# Patient Record
Sex: FEMALE | Race: WHITE | Employment: OTHER | URBAN - METROPOLITAN AREA
[De-identification: names, ages, dates, MRNs, and addresses within clinical notes are randomized per-mention and may not be internally consistent; named-entity substitution may affect disease eponyms.]

---

## 2017-08-15 ENCOUNTER — ANESTHESIA EVENT (OUTPATIENT)
Dept: SURGERY | Age: 67
End: 2017-08-15
Payer: COMMERCIAL

## 2017-08-15 ENCOUNTER — HOSPITAL ENCOUNTER (OUTPATIENT)
Dept: PREADMISSION TESTING | Age: 67
Discharge: HOME | End: 2017-08-15
Payer: COMMERCIAL

## 2017-08-15 VITALS
DIASTOLIC BLOOD PRESSURE: 86 MMHG | TEMPERATURE: 98.6 F | SYSTOLIC BLOOD PRESSURE: 125 MMHG | OXYGEN SATURATION: 100 % | RESPIRATION RATE: 63 BRPM | HEART RATE: 64 BPM | BODY MASS INDEX: 23.62 KG/M2 | HEIGHT: 68 IN | WEIGHT: 155.87 LBS

## 2017-08-15 LAB
ANION GAP BLD CALC-SCNC: 6 MMOL/L (ref 5–15)
ATRIAL RATE: 60 BPM
BASOPHILS # BLD AUTO: 0 K/UL (ref 0–0.1)
BASOPHILS # BLD: 0 % (ref 0–1)
BUN SERPL-MCNC: 20 MG/DL (ref 6–20)
BUN/CREAT SERPL: 26 (ref 12–20)
CALCIUM SERPL-MCNC: 10.2 MG/DL (ref 8.5–10.1)
CALCULATED P AXIS, ECG09: 54 DEGREES
CALCULATED R AXIS, ECG10: 23 DEGREES
CALCULATED T AXIS, ECG11: 29 DEGREES
CHLORIDE SERPL-SCNC: 103 MMOL/L (ref 97–108)
CO2 SERPL-SCNC: 32 MMOL/L (ref 21–32)
CREAT SERPL-MCNC: 0.76 MG/DL (ref 0.55–1.02)
DIAGNOSIS, 93000: NORMAL
EOSINOPHIL # BLD: 0.1 K/UL (ref 0–0.4)
EOSINOPHIL NFR BLD: 2 % (ref 0–7)
ERYTHROCYTE [DISTWIDTH] IN BLOOD BY AUTOMATED COUNT: 13.6 % (ref 11.5–14.5)
GLUCOSE SERPL-MCNC: 94 MG/DL (ref 65–100)
HCT VFR BLD AUTO: 44.1 % (ref 35–47)
HGB BLD-MCNC: 14.6 G/DL (ref 11.5–16)
LYMPHOCYTES # BLD AUTO: 32 % (ref 12–49)
LYMPHOCYTES # BLD: 2.6 K/UL (ref 0.8–3.5)
MCH RBC QN AUTO: 30.9 PG (ref 26–34)
MCHC RBC AUTO-ENTMCNC: 33.1 G/DL (ref 30–36.5)
MCV RBC AUTO: 93.2 FL (ref 80–99)
MONOCYTES # BLD: 0.7 K/UL (ref 0–1)
MONOCYTES NFR BLD AUTO: 8 % (ref 5–13)
NEUTS SEG # BLD: 4.8 K/UL (ref 1.8–8)
NEUTS SEG NFR BLD AUTO: 58 % (ref 32–75)
P-R INTERVAL, ECG05: 170 MS
PLATELET # BLD AUTO: 346 K/UL (ref 150–400)
POTASSIUM SERPL-SCNC: 5 MMOL/L (ref 3.5–5.1)
Q-T INTERVAL, ECG07: 420 MS
QRS DURATION, ECG06: 88 MS
QTC CALCULATION (BEZET), ECG08: 420 MS
RBC # BLD AUTO: 4.73 M/UL (ref 3.8–5.2)
SODIUM SERPL-SCNC: 141 MMOL/L (ref 136–145)
VENTRICULAR RATE, ECG03: 60 BPM
WBC # BLD AUTO: 8.3 K/UL (ref 3.6–11)

## 2017-08-15 PROCEDURE — 80048 BASIC METABOLIC PNL TOTAL CA: CPT | Performed by: SURGERY

## 2017-08-15 PROCEDURE — 85025 COMPLETE CBC W/AUTO DIFF WBC: CPT | Performed by: SURGERY

## 2017-08-15 PROCEDURE — 36415 COLL VENOUS BLD VENIPUNCTURE: CPT | Performed by: SURGERY

## 2017-08-15 PROCEDURE — 93005 ELECTROCARDIOGRAM TRACING: CPT

## 2017-08-15 RX ORDER — TRAZODONE HYDROCHLORIDE 50 MG/1
50 TABLET ORAL
COMMUNITY

## 2017-08-15 RX ORDER — BISMUTH SUBSALICYLATE 262 MG
1 TABLET,CHEWABLE ORAL DAILY
COMMUNITY
End: 2020-03-09

## 2017-08-15 NOTE — PERIOP NOTES
Patient aware needs to arrive at 8am.  Patient removed from preop phone call list and advised to arrive 0800 1st floor registration.

## 2017-08-15 NOTE — PERIOP NOTES
[facility]  PREOPERATIVE INSTRUCTIONS    Surgery Date:   08/16/17      Surgery arrival time given by surgeon: YES 0800 for Mammo  (If [facility] staff will call you between 3pm - 7pm the day before surgery with your arrival time. If your surgery is on a Monday, we will call you the preceding Friday. Please call [phone] after 7pm if you did not receive your arrival time.)  1. Report  to the 2nd Floor Admitting Desk on the day of your surgery. Bring your insurance card, photo identification, and any copayment (if applicable). 2. You must have a responsible adult to drive you home and stay with you the first 24 hours after surgery if you are going home the same day of your surgery. 3. Nothing to eat or drink after midnight the night before surgery. This means NO water, gum, mints, coffee, juice, etc.    4. MEDICATIONS TO TAKE THE MORNING OF SURGERY WITH A SIP OF WATER:None  5. No alcoholic beverages 24 hours before and after your surgery. 6. If you are being admitted to the hospital,please leave personal belongings/luggage in your car until you have an assigned hospital room number. ( The hospital discharge time is 12 PM NOON. Your adult  should be at the hospital prior to the noon discharge time unless otherwise instructed.)   7. STOP Aspirin and/or any non-steroidal anti-inflammatory drugs (i.e. Ibuprofen, Naproxen, Advil, Aleve) as directed by your surgeon. You may take Tylenol. Stop herbal supplements 1 week prior to  surgery. 8. If you are currently taking Plavix, Coumadin,or any other blood-thinning/ anticoagulant medication contact your surgeon for instructions. 9. Wear comfortable clothes. Wear your glasses instead of contacts. Please leave all money, jewelry and valuables at home. No make up, particularly mascara, the day of surgery. 10.  REMOVE ALL body piercings, rings,and jewelry and leave at home. Wear your hair loose or down, no pony-tails, buns, or any metal hair clips.    11. If you shower the morning of surgery, please do not apply any lotions, powders, or deodorants afterwards. Do not shave any body area within 24 hours of your surgery. 12. Please follow all instructions to avoid any potential surgical cancellation. 13. Should your physical condition change, (i.e. fever, cold, flu, etc.) please notify your surgeon as soon as possible. 14. It is important to be on time. If a situation occurs where you may be delayed, please call:  [phone] / [phone] on the day of surgery. 15. The Preadmission Testing staff can be reached at [phone]. 16. Special instructions: None  17. The patient was contacted  in person. She  verbalize  understanding of all instructions does not  need reinforcement.

## 2017-08-16 ENCOUNTER — HOSPITAL ENCOUNTER (OUTPATIENT)
Age: 67
Setting detail: OUTPATIENT SURGERY
Discharge: HOME | End: 2017-08-16
Attending: SURGERY | Admitting: SURGERY
Payer: COMMERCIAL

## 2017-08-16 ENCOUNTER — APPOINTMENT (OUTPATIENT)
Dept: MAMMOGRAPHY | Age: 67
End: 2017-08-16
Attending: SURGERY
Payer: COMMERCIAL

## 2017-08-16 ENCOUNTER — HOSPITAL ENCOUNTER (OUTPATIENT)
Dept: MAMMOGRAPHY | Age: 67
Discharge: HOME | End: 2017-08-16
Attending: SURGERY
Payer: COMMERCIAL

## 2017-08-16 ENCOUNTER — ANESTHESIA (OUTPATIENT)
Dept: SURGERY | Age: 67
End: 2017-08-16
Payer: COMMERCIAL

## 2017-08-16 VITALS
OXYGEN SATURATION: 98 % | SYSTOLIC BLOOD PRESSURE: 105 MMHG | BODY MASS INDEX: 23.62 KG/M2 | HEART RATE: 65 BPM | RESPIRATION RATE: 14 BRPM | DIASTOLIC BLOOD PRESSURE: 55 MMHG | HEIGHT: 68 IN | WEIGHT: 155.87 LBS | TEMPERATURE: 98 F

## 2017-08-16 DIAGNOSIS — N60.92 ATYPICAL HYPERPLASIA OF LEFT BREAST: ICD-10-CM

## 2017-08-16 PROCEDURE — 77030032490 HC SLV COMPR SCD KNE COVD -B: Performed by: SURGERY

## 2017-08-16 PROCEDURE — 76210000026 HC REC RM PH II 1 TO 1.5 HR: Performed by: SURGERY

## 2017-08-16 PROCEDURE — 77030031139 HC SUT VCRL2 J&J -A: Performed by: SURGERY

## 2017-08-16 PROCEDURE — 77030020782 HC GWN BAIR PAWS FLX 3M -B

## 2017-08-16 PROCEDURE — 76060000032 HC ANESTHESIA 0.5 TO 1 HR: Performed by: SURGERY

## 2017-08-16 PROCEDURE — 74011000250 HC RX REV CODE- 250

## 2017-08-16 PROCEDURE — 77030018846 HC SOL IRR STRL H20 ICUM -A: Performed by: SURGERY

## 2017-08-16 PROCEDURE — 74011250636 HC RX REV CODE- 250/636

## 2017-08-16 PROCEDURE — 88307 TISSUE EXAM BY PATHOLOGIST: CPT | Performed by: SURGERY

## 2017-08-16 PROCEDURE — 74011250636 HC RX REV CODE- 250/636: Performed by: ANESTHESIOLOGY

## 2017-08-16 PROCEDURE — 76010000138 HC OR TIME 0.5 TO 1 HR: Performed by: SURGERY

## 2017-08-16 PROCEDURE — 74011000250 HC RX REV CODE- 250: Performed by: SURGERY

## 2017-08-16 PROCEDURE — 77030011267 HC ELECTRD BLD COVD -A: Performed by: SURGERY

## 2017-08-16 PROCEDURE — 74011250637 HC RX REV CODE- 250/637: Performed by: SURGERY

## 2017-08-16 RX ORDER — DIPHENHYDRAMINE HYDROCHLORIDE 50 MG/ML
12.5 INJECTION, SOLUTION INTRAMUSCULAR; INTRAVENOUS AS NEEDED
Status: DISCONTINUED | OUTPATIENT
Start: 2017-08-16 | End: 2017-08-16 | Stop reason: HOSPADM

## 2017-08-16 RX ORDER — MIDAZOLAM HYDROCHLORIDE 1 MG/ML
2 INJECTION, SOLUTION INTRAMUSCULAR; INTRAVENOUS
Status: DISCONTINUED | OUTPATIENT
Start: 2017-08-16 | End: 2017-08-16 | Stop reason: HOSPADM

## 2017-08-16 RX ORDER — CLINDAMYCIN PHOSPHATE 900 MG/50ML
INJECTION INTRAVENOUS AS NEEDED
Status: DISCONTINUED | OUTPATIENT
Start: 2017-08-16 | End: 2017-08-16 | Stop reason: HOSPADM

## 2017-08-16 RX ORDER — PROPOFOL 10 MG/ML
INJECTION, EMULSION INTRAVENOUS
Status: DISCONTINUED | OUTPATIENT
Start: 2017-08-16 | End: 2017-08-16 | Stop reason: HOSPADM

## 2017-08-16 RX ORDER — LIDOCAINE HYDROCHLORIDE 20 MG/ML
INJECTION, SOLUTION INFILTRATION; PERINEURAL AS NEEDED
Status: DISCONTINUED | OUTPATIENT
Start: 2017-08-16 | End: 2017-08-16 | Stop reason: HOSPADM

## 2017-08-16 RX ORDER — CLINDAMYCIN PHOSPHATE 900 MG/50ML
900 INJECTION INTRAVENOUS ONCE
Status: DISCONTINUED | OUTPATIENT
Start: 2017-08-16 | End: 2017-08-16 | Stop reason: HOSPADM

## 2017-08-16 RX ORDER — SODIUM CHLORIDE, SODIUM LACTATE, POTASSIUM CHLORIDE, CALCIUM CHLORIDE 600; 310; 30; 20 MG/100ML; MG/100ML; MG/100ML; MG/100ML
125 INJECTION, SOLUTION INTRAVENOUS CONTINUOUS
Status: DISCONTINUED | OUTPATIENT
Start: 2017-08-16 | End: 2017-08-16 | Stop reason: HOSPADM

## 2017-08-16 RX ORDER — MIDAZOLAM HYDROCHLORIDE 1 MG/ML
INJECTION, SOLUTION INTRAMUSCULAR; INTRAVENOUS AS NEEDED
Status: DISCONTINUED | OUTPATIENT
Start: 2017-08-16 | End: 2017-08-16 | Stop reason: HOSPADM

## 2017-08-16 RX ORDER — HYDROMORPHONE HYDROCHLORIDE 1 MG/ML
.25-1 INJECTION, SOLUTION INTRAMUSCULAR; INTRAVENOUS; SUBCUTANEOUS
Status: DISCONTINUED | OUTPATIENT
Start: 2017-08-16 | End: 2017-08-16 | Stop reason: HOSPADM

## 2017-08-16 RX ORDER — ONDANSETRON 2 MG/ML
INJECTION INTRAMUSCULAR; INTRAVENOUS AS NEEDED
Status: DISCONTINUED | OUTPATIENT
Start: 2017-08-16 | End: 2017-08-16 | Stop reason: HOSPADM

## 2017-08-16 RX ORDER — NALOXONE HYDROCHLORIDE 0.4 MG/ML
0.2 INJECTION, SOLUTION INTRAMUSCULAR; INTRAVENOUS; SUBCUTANEOUS
Status: DISCONTINUED | OUTPATIENT
Start: 2017-08-16 | End: 2017-08-16 | Stop reason: HOSPADM

## 2017-08-16 RX ORDER — HYDROCODONE BITARTRATE AND ACETAMINOPHEN 5; 325 MG/1; MG/1
2 TABLET ORAL
Status: COMPLETED | OUTPATIENT
Start: 2017-08-16 | End: 2017-08-16

## 2017-08-16 RX ORDER — LIDOCAINE HYDROCHLORIDE 10 MG/ML
0.1 INJECTION, SOLUTION EPIDURAL; INFILTRATION; INTRACAUDAL; PERINEURAL AS NEEDED
Status: DISCONTINUED | OUTPATIENT
Start: 2017-08-16 | End: 2017-08-16 | Stop reason: HOSPADM

## 2017-08-16 RX ORDER — ONDANSETRON 4 MG/1
4 TABLET, ORALLY DISINTEGRATING ORAL
Qty: 20 TAB | Refills: 0 | Status: SHIPPED | OUTPATIENT
Start: 2017-08-16 | End: 2018-10-09

## 2017-08-16 RX ORDER — FENTANYL CITRATE 50 UG/ML
INJECTION, SOLUTION INTRAMUSCULAR; INTRAVENOUS AS NEEDED
Status: DISCONTINUED | OUTPATIENT
Start: 2017-08-16 | End: 2017-08-16 | Stop reason: HOSPADM

## 2017-08-16 RX ORDER — FLUMAZENIL 0.1 MG/ML
0.2 INJECTION INTRAVENOUS
Status: DISCONTINUED | OUTPATIENT
Start: 2017-08-16 | End: 2017-08-16 | Stop reason: HOSPADM

## 2017-08-16 RX ORDER — LIDOCAINE HYDROCHLORIDE 10 MG/ML
4 INJECTION, SOLUTION EPIDURAL; INFILTRATION; INTRACAUDAL; PERINEURAL
Status: COMPLETED | OUTPATIENT
Start: 2017-08-16 | End: 2017-08-16

## 2017-08-16 RX ORDER — LIDOCAINE HYDROCHLORIDE AND EPINEPHRINE 10; 10 MG/ML; UG/ML
INJECTION, SOLUTION INFILTRATION; PERINEURAL AS NEEDED
Status: DISCONTINUED | OUTPATIENT
Start: 2017-08-16 | End: 2017-08-16 | Stop reason: HOSPADM

## 2017-08-16 RX ORDER — HYDROCODONE BITARTRATE AND ACETAMINOPHEN 5; 325 MG/1; MG/1
1 TABLET ORAL
Qty: 30 TAB | Refills: 0 | Status: SHIPPED | OUTPATIENT
Start: 2017-08-16 | End: 2018-10-09

## 2017-08-16 RX ORDER — GLYCOPYRROLATE 0.2 MG/ML
INJECTION INTRAMUSCULAR; INTRAVENOUS AS NEEDED
Status: DISCONTINUED | OUTPATIENT
Start: 2017-08-16 | End: 2017-08-16 | Stop reason: HOSPADM

## 2017-08-16 RX ADMIN — FENTANYL CITRATE 25 MCG: 50 INJECTION, SOLUTION INTRAMUSCULAR; INTRAVENOUS at 11:31

## 2017-08-16 RX ADMIN — GLYCOPYRROLATE 0.2 MG: 0.2 INJECTION INTRAMUSCULAR; INTRAVENOUS at 11:13

## 2017-08-16 RX ADMIN — SODIUM CHLORIDE, POTASSIUM CHLORIDE, SODIUM LACTATE AND CALCIUM CHLORIDE 1000 ML: 600; 310; 30; 20 INJECTION, SOLUTION INTRAVENOUS at 11:00

## 2017-08-16 RX ADMIN — LIDOCAINE HYDROCHLORIDE 2 ML: 10 INJECTION, SOLUTION EPIDURAL; INFILTRATION; INTRACAUDAL; PERINEURAL at 09:49

## 2017-08-16 RX ADMIN — SODIUM CHLORIDE, POTASSIUM CHLORIDE, SODIUM LACTATE AND CALCIUM CHLORIDE: 600; 310; 30; 20 INJECTION, SOLUTION INTRAVENOUS at 11:43

## 2017-08-16 RX ADMIN — MIDAZOLAM HYDROCHLORIDE 1 MG: 1 INJECTION, SOLUTION INTRAMUSCULAR; INTRAVENOUS at 11:05

## 2017-08-16 RX ADMIN — MIDAZOLAM HYDROCHLORIDE 1 MG: 1 INJECTION, SOLUTION INTRAMUSCULAR; INTRAVENOUS at 11:04

## 2017-08-16 RX ADMIN — HYDROMORPHONE HYDROCHLORIDE 0.5 MG: 1 INJECTION, SOLUTION INTRAMUSCULAR; INTRAVENOUS; SUBCUTANEOUS at 12:09

## 2017-08-16 RX ADMIN — CLINDAMYCIN PHOSPHATE 900 MG: 900 INJECTION INTRAVENOUS at 11:04

## 2017-08-16 RX ADMIN — LIDOCAINE HYDROCHLORIDE 75 MG: 20 INJECTION, SOLUTION INFILTRATION; PERINEURAL at 11:13

## 2017-08-16 RX ADMIN — PROPOFOL 100 MCG/KG/MIN: 10 INJECTION, EMULSION INTRAVENOUS at 11:11

## 2017-08-16 RX ADMIN — MIDAZOLAM HYDROCHLORIDE 2 MG: 1 INJECTION, SOLUTION INTRAMUSCULAR; INTRAVENOUS at 11:07

## 2017-08-16 RX ADMIN — HYDROCODONE BITARTRATE AND ACETAMINOPHEN 2 TABLET: 5; 325 TABLET ORAL at 13:08

## 2017-08-16 RX ADMIN — FENTANYL CITRATE 25 MCG: 50 INJECTION, SOLUTION INTRAMUSCULAR; INTRAVENOUS at 11:23

## 2017-08-16 RX ADMIN — ONDANSETRON 4 MG: 2 INJECTION INTRAMUSCULAR; INTRAVENOUS at 11:15

## 2017-08-16 RX ADMIN — FENTANYL CITRATE 25 MCG: 50 INJECTION, SOLUTION INTRAMUSCULAR; INTRAVENOUS at 11:13

## 2017-08-16 NOTE — IP AVS SNAPSHOT
[facility] 
 
 
 [address] [place] [address] 
[phone] Patient: Gary Diallo MRN: [id number] :1950 You are allergic to the following Allergen Reactions Penicillins Unknown (comments) Told all of her life she was allergic. Unknown if taken Keflex Recent Documentation Height Weight BMI OB Status Smoking Status 1.715 m 70.7 kg 24.05 kg/m2 Postmenopausal Former Smoker Emergency Contacts Name Discharge Info Relation Home Work Mobile Jet Ayala DISCHARGE CAREGIVER [3] Spouse [3] [phone] About your hospitalization You were admitted on:  2017 You last received care in the:  [facility] You were discharged on:  2017 Unit phone number:  [phone] Why you were hospitalized Your primary diagnosis was:  Not on File Providers Seen During Your Hospitalizations Provider Role Specialty Primary office phone Nico Holder MD Attending Provider General Surgery [phone] Your Primary Care Physician (PCP) Primary Care Physician Office Phone Office Fax Aminata Nieto [phone] Follow-up Information Follow up With Details Comments Contact Info Stephane Navarro MD   [address] 
[phone] Current Discharge Medication List  
  
START taking these medications Dose & Instructions Dispensing Information Comments Morning Noon Evening Bedtime HYDROcodone-acetaminophen 5-325 mg per tablet Commonly known as:  Payal Lara Your last dose was: Your next dose is:    
   
   
 Dose:  1 Tab Take 1 Tab by mouth every four (4) hours as needed for Pain. Max Daily Amount: 6 Tabs. Quantity:  30 Tab Refills:  0  
     
   
   
   
  
 ondansetron 4 mg disintegrating tablet Commonly known as:  ZOFRAN ODT Your last dose was: Your next dose is:    
   
   
 Dose:  4 mg Take 1 Tab by mouth every eight (8) hours as needed for Nausea. Quantity:  20 Tab Refills:  0 CONTINUE these medications which have NOT CHANGED Dose & Instructions Dispensing Information Comments Morning Noon Evening Bedtime CALCIUM PO Your last dose was: Your next dose is:    
   
   
 Dose:  1 Tab Take 1 Tab by mouth daily. Refills:  0  
     
   
   
   
  
 multivitamin tablet Commonly known as:  ONE A DAY Your last dose was: Your next dose is:    
   
   
 Dose:  1 Tab Take 1 Tab by mouth daily. Refills:  0  
     
   
   
   
  
 traZODone 50 mg tablet Commonly known as:  Mikki Amadorman Your last dose was: Your next dose is:    
   
   
 Dose:  50 mg Take 50 mg by mouth nightly. Refills:  0 Where to Get Your Medications Information on where to get these meds will be given to you by the nurse or doctor. ! Ask your nurse or doctor about these medications HYDROcodone-acetaminophen 5-325 mg per tablet  
 ondansetron 4 mg disintegrating tablet Discharge Instructions General Discharge Instructions Patient ID: Len Holman [id number] 
32 y.o. 
1950 PATIENT INSTRUCTIONS: 
 
Take Home Medications What to do at Home: 
 
Recommended diet: Clear liquids then advance as tolerated. Recommended activity: Activity as tolerated and no driving for today. No driving while still taking pain medication. You may shower this evening if you would like. If you experience any of the following symptoms redness or discharge, please follow up with me at [phone]. Follow-up with Dr. Vero Durant in 7-10 days. Information obtained by : 
I understand that if any problems occur once I am at home I am to contact my physician. I understand and acknowledge receipt of the instructions indicated above. Physician's or R.N.'s Signature                                                                  Date/Time Patient or Representative Signature                                                          Date/Time DISCHARGE SUMMARY from your Nurse The following personal items collected during your admission are returned to you:  
Dental Appliance: Dental Appliances: None Vision:   
Hearing Aid:   
Jewelry: Jewelry: None Clothing: Clothing: Pants, Shirt, Undergarments Other Valuables: Other Valuables: Adilene Moreno Valuables sent to safe:   
 
PATIENT INSTRUCTIONS: 
 
After general anesthesia or intravenous sedation, for 24 hours or while taking prescription Narcotics: · Limit your activities · Do not drive and operate hazardous machinery · Do not make important personal or business decisions · Do  not drink alcoholic beverages · If you have not urinated within 8 hours after discharge, please contact your surgeon on call. Report the following to your surgeon: 
· Excessive pain, swelling, redness or odor of or around the surgical area · Temperature over 100.5 · Nausea and vomiting lasting longer than 4 hours or if unable to take medications · Any signs of decreased circulation or nerve impairment to extremity: change in color, persistent  numbness, tingling, coldness or increase pain · Any questions [address] Breathing deep and coughing are very important exercises to do after surgery. Deep breathing and coughing open the little air tubes and air sacks in your lungs. You take deep breaths every day. You may not even notice - it is just something you do when you sigh or yawn. It is a natural exercise you do to keep these air passages open. After surgery, take deep breaths and cough, on purpose. Coughing and deep breathing help prevent bronchitis and pneumonia after surgery. If you had chest or belly surgery, use a pillow as a \"hug leidy\" and hold it tightly to your chest or belly when you cough. DIRECTIONS: 
6. Take 10 to 15 slow deep breaths every hour while awake. 7. Breathe in deeply, and hold it for 2 seconds. 8. Exhale slowly through puckered lips, like blowing up a balloon. 9. After every 4th or 5th deep breath, hug your pillow to your chest or belly and give a hard, deep cough. Yes, it will probably hurt. But doing this exercise is very important part of healing after surgery. Take your pain medicine to help you do this exercise without too much pain. IF YOU HAVE BEEN DIAGNOSED WITH SLEEP APNEA, PLEASE USE YOUR SLEEP APNEA DEVICE OR CPAP MACHINE WHEN YOU INTEND TO NAP AFTER TAKING PAIN MEDICATION. Ankle Pumps Ankle pumps increase the circulation of oxygenated blood to your lower extremities and decrease your risk for circulation problems such as blood clots. They also stretch the muscles, tendons and ligaments in your foot and ankle, and prevent joint contracture in the ankle and foot, especially after surgeries on the legs. It is important to do ankle pump exercises regularly after surgery because immobility increases your risk for developing a blood clot. Your doctor may also have you take an Aspirin for the next few days as well. If your doctor did not ask you to take an Aspirin, consult with him before starting Aspirin therapy on your own. Slowly point your foot forward, feeling the muscles on the top of your lower leg stretch, and hold this position for 5 seconds. Next, pull your foot back toward you as far as possible, stretching the calf muscles, and hold that position for 5 seconds. Repeat with the other foot. Perform 10 repetitions every hour while awake for both ankles if possible (down and then up with the foot once is one repetition). You should feel gentle stretching of the muscles in your lower leg when doing this exercise. If you feel pain, or your range of motion is limited, don't  Push too hard. Only go the limit your joint and muscles will let you go. If you have increasing pain, progressively worsening leg warmth or swelling, STOP the exercise and call your doctor. Below is information about the medications your doctor is prescribing after your visit: 
 
 
 
 
 
  
 
Discharge Orders None General Information Please provide this summary of care documentation to your next provider. Patient Signature:  ____________________________________________________________ Date:  ____________________________________________________________  
  
Elizabethann Glad Provider Signature:  ____________________________________________________________ Date:  ____________________________________________________________

## 2017-08-16 NOTE — ANESTHESIA PREPROCEDURE EVALUATION
Anesthetic History   No history of anesthetic complications            Review of Systems / Medical History  Patient summary reviewed, nursing notes reviewed and pertinent labs reviewed    Pulmonary  Within defined limits                 Neuro/Psych   Within defined limits           Cardiovascular  Within defined limits                Exercise tolerance: >4 METS     GI/Hepatic/Renal  Within defined limits   GERD           Endo/Other        Cancer     Other Findings   Comments: Breast ca           Physical Exam    Airway  Mallampati: II    Neck ROM: normal range of motion   Mouth opening: Normal     Cardiovascular  Regular rate and rhythm,  S1 and S2 normal,  no murmur, click, rub, or gallop  Rhythm: regular  Rate: normal         Dental  No notable dental hx       Pulmonary  Breath sounds clear to auscultation               Abdominal  GI exam deferred       Other Findings            Anesthetic Plan    ASA: 2  Anesthesia type: MAC          Induction: Intravenous  Anesthetic plan and risks discussed with: Patient

## 2017-08-16 NOTE — OP NOTES
Breast Lumpectomy with Sentinal Node Biopsy Procedure Note    Indications: This patient presents with history of a left breast abnormality. This was biopsied and found to be ADH. She presents for excisional biopsy. Pre-operative Diagnosis: left ADH    Post-operative Diagnosis: same    Surgeon: Antonio Dickinson MD     Assistants: Lamont Muñiz    Anesthesia: Monitored Local Anesthesia with Sedation    ASA Class: 2    Procedure Details   The patient was seen in the Holding Room. The risks, benefits, complications, treatment options, and expected outcomes were discussed with the patient. The possibilities of reaction to medication, pulmonary aspiration, bleeding, infection, the need for additional procedures, failure to diagnose a condition, and creating a complication requiring transfusion or operation were discussed with the patient. The patient concurred with the proposed plan, giving informed consent. The site of surgery properly noted/marked. The patient was taken to Operating Room, identified as Eula Nany, and the procedure verified as lumpectomy. A Time Out was held and the above information confirmed. After induction of anesthesia, the left  breast and chest were prepped and draped in standard fashion. The lumpectomy was performed by creating a curvilinear incision over the upper outer quadrant of the breast near the wire insertion site. Flaps were raised so the wire could be delivered into the wound. Cautery was used to circumferentially dissect down to the tip of the wire. Hematoma was noted and included in the specimen. Orientation sutures were placed, long lateral and short superior, and hemostasis was achieved with cautery. The wound was irrigated and closed with a 3-0 and 4-0 Vicryl subcuticular closure in layers. Dermabond was applied. Sterile dressings were applied. At the end of the operation, all sponge, instrument, and needle counts were correct.     Findings:  grossly clear surgical margins    Estimated Blood Loss:  Minimal           Drains: none                  Specimens: left breast NL biopsy               Complications:  None; patient tolerated the procedure well. Disposition: PACU - hemodynamically stable.            Condition: Stable    Attending Attestation: I performed the procedure

## 2017-08-16 NOTE — ANESTHESIA POSTPROCEDURE EVALUATION
Post-Anesthesia Evaluation and Assessment    Patient: Paul Baca MRN: [id number]  SSN: [id number]    YOB: 1950  Age: 61 y.o. Sex: female       Cardiovascular Function/Vital Signs  Visit Vitals    /60    Pulse 71    Temp 36.5 °C (97.7 °F)    Resp (!) 7    Ht 5' 7.5\" (1.715 m)    Wt 70.7 kg (155 lb 13.8 oz)    SpO2 97%    BMI 24.05 kg/m2       Patient is status post MAC anesthesia for Procedure(s):  LEFT BREAST NEEDLE LOCALIZATION BIOPSY (8:30 NL). Nausea/Vomiting: None    Postoperative hydration reviewed and adequate. Pain:  Pain Scale 1: Numeric (0 - 10) (08/16/17 1209)  Pain Intensity 1: 5 (08/16/17 1209)   Managed    Neurological Status:   Neuro (WDL): Within Defined Limits (08/16/17 1158)  Neuro  Neurologic State: Drowsy (08/16/17 1158)   At baseline    Mental Status and Level of Consciousness: Arousable    Pulmonary Status:   O2 Device: Room air (08/16/17 1158)   Adequate oxygenation and airway patent    Complications related to anesthesia: None    Post-anesthesia assessment completed.  No concerns    Signed By: Maurisio Crane MD     August 16, 2017

## 2017-08-16 NOTE — DISCHARGE INSTRUCTIONS
General Discharge Instructions    Patient ID:  Gabby Fuentes  [id number]  61 y.o.  1950    PATIENT INSTRUCTIONS:    Take Home Medications     What to do at Home:    Recommended diet: Clear liquids then advance as tolerated. Recommended activity: Activity as tolerated and no driving for today. No driving while still taking pain medication. You may shower this evening if you would like. If you experience any of the following symptoms redness or discharge, please follow up with me at [phone]. Follow-up with Dr. Jayna Ziegler in 7-10 days. Information obtained by :  I understand that if any problems occur once I am at home I am to contact my physician. I understand and acknowledge receipt of the instructions indicated above. [de-identified] or R.N.'s Signature                                                                  Date/Time                                                                                                                                              Patient or Representative Signature                                                          Date/Time    DISCHARGE SUMMARY from your Nurse    The following personal items collected during your admission are returned to you:   Dental Appliance: Dental Appliances: None  Vision:    Hearing Aid:    Jewelry: Jewelry: None  Clothing: Clothing: Pants, Shirt, Undergarments  Other Valuables:  Other Valuables: Eyeglasses  Valuables sent to safe:      PATIENT INSTRUCTIONS:    After general anesthesia or intravenous sedation, for 24 hours or while taking prescription Narcotics:  · Limit your activities  · Do not drive and operate hazardous machinery  · Do not make important personal or business decisions  · Do  not drink alcoholic beverages  · If you have not urinated within 8 hours after discharge, please contact your surgeon on call. Report the following to your surgeon:  · Excessive pain, swelling, redness or odor of or around the surgical area  · Temperature over 100.5  · Nausea and vomiting lasting longer than 4 hours or if unable to take medications  · Any signs of decreased circulation or nerve impairment to extremity: change in color, persistent  numbness, tingling, coldness or increase pain  · Any questions    COUGH AND DEEP BREATHE    Breathing deep and coughing are very important exercises to do after surgery. Deep breathing and coughing open the little air tubes and air sacks in your lungs. You take deep breaths every day. You may not even notice - it is just something you do when you sigh or yawn. It is a natural exercise you do to keep these air passages open. After surgery, take deep breaths and cough, on purpose. Coughing and deep breathing help prevent bronchitis and pneumonia after surgery. If you had chest or belly surgery, use a pillow as a \"hug buddy\" and hold it tightly to your chest or belly when you cough. DIRECTIONS:  6. Take 10 to 15 slow deep breaths every hour while awake. 7. Breathe in deeply, and hold it for 2 seconds. 8. Exhale slowly through puckered lips, like blowing up a balloon. 9. After every 4th or 5th deep breath, hug your pillow to your chest or belly and give a hard, deep cough. Yes, it will probably hurt. But doing this exercise is very important part of healing after surgery. Take your pain medicine to help you do this exercise without too much pain. IF YOU HAVE BEEN DIAGNOSED WITH SLEEP APNEA, PLEASE USE YOUR SLEEP APNEA DEVICE OR CPAP MACHINE WHEN YOU INTEND TO NAP AFTER TAKING PAIN MEDICATION. Ankle Pumps    Ankle pumps increase the circulation of oxygenated blood to your lower extremities and decrease your risk for circulation problems such as blood clots.  They also stretch the muscles, tendons and ligaments in your foot and ankle, and prevent joint contracture in the ankle and foot, especially after surgeries on the legs. It is important to do ankle pump exercises regularly after surgery because immobility increases your risk for developing a blood clot. Your doctor may also have you take an Aspirin for the next few days as well. If your doctor did not ask you to take an Aspirin, consult with him before starting Aspirin therapy on your own. Slowly point your foot forward, feeling the muscles on the top of your lower leg stretch, and hold this position for 5 seconds. Next, pull your foot back toward you as far as possible, stretching the calf muscles, and hold that position for 5 seconds. Repeat with the other foot. Perform 10 repetitions every hour while awake for both ankles if possible (down and then up with the foot once is one repetition). You should feel gentle stretching of the muscles in your lower leg when doing this exercise. If you feel pain, or your range of motion is limited, don't  Push too hard. Only go the limit your joint and muscles will let you go. If you have increasing pain, progressively worsening leg warmth or swelling, STOP the exercise and call your doctor. Below is information about the medications your doctor is prescribing after your visit:    Other information in your discharge envelope:  []     PRESCRIPTIONS  []     PHYSICAL THERAPY PRESCRIPTION  []     APPOINTMENT CARDS  []     Regional Anesthesia Pamphlet for block or block with On-Q Catheter from Anesthesia Service  []     Medical device information sheets/pamphlets from their    []     School/work excuse note. []     /parent work excuse note. These are general instructions for a healthy lifestyle:    *  Please give a list of your current medications to your Primary Care Provider.   *  Please update this list whenever your medications are discontinued, doses are      changed, or new medications (including over-the-counter products) are added. *  Please carry medication information at all times in case of emergency situations. About Smoking  No smoking / No tobacco products / Avoid exposure to second hand smoke    Surgeon General's Warning:  Quitting smoking now greatly reduces serious risk to your health. Obesity, smoking, and sedentary lifestyle greatly increases your risk for illness and disease. A healthy diet, regular physical exercise & weight monitoring are important for maintaining a healthy lifestyle. Congestive Heart Failure  You may be retaining fluid if you have a history of heart failure or if you experience any of the following symptoms:  Weight gain of 3 pounds or more overnight or 5 pounds in a week, increased swelling in our hands or feet or shortness of breath while lying flat in bed. Please call your doctor as soon as you notice any of these symptoms; do not wait until your next office visit. Recognize signs and symptoms of STROKE:  F - face looks uneven  A - arms unable to move or move even  S - speech slurred or non-existent  T - time-call 911 as soon as signs and symptoms begin-DO NOT go         Back to bed or wait to see if you get better-TIME IS BRAIN. Warning signs of HEART ATTACK  Call 911 if you have these symptoms    · Chest discomfort. Most heart attacks involve discomfort in the center of the chest that lasts more than a few minutes, or that goes away and comes back. It can feel like uncomfortable pressure, squeezing, fullness, or pain. · Discomfort in other areas of the upper body. Symptoms can include pain or discomfort in one or both        Arms, the back, neck, jaw, or stomach. ·  Shortness of breath with or without chest discomfort. · Other signs may include breaking out in a cold sweat, nausea, or lightheadedness    Don't wait more than five minutes to call 911 - MINUTES MATTER! Fast action can save your life.   Calling 911 is almost always the fastest way to get lifesaving treatment. Emergency Medical Services staff can begin treatment when they arrive - up to an hour sooner than if someone gets to the hospital by car. JUSTINO MARTINEZ MEDICATION AND SIDE EFFECT GUIDE    The Matilde Arevalo MEDICATION AND SIDE EFFECT GUIDE was provided to the PATIENT AND CARE PROVIDER.   Information provided includes instruction about drug purpose and common side effects for the following medications:    · Norco

## 2017-08-16 NOTE — IP AVS SNAPSHOT
Home 
 
 
 [address] [place] [address] 
[phone] Patient: Lindsey Jade MRN: [id number] :1950 Current Discharge Medication List  
  
CONTINUE these medications which have NOT CHANGED Dose & Instructions Dispensing Information Comments Morning Noon Evening Bedtime CALCIUM PO Your last dose was: Your next dose is:    
   
   
 Dose:  1 Tab Take 1 Tab by mouth daily. Refills:  0  
     
   
   
   
  
 multivitamin tablet Commonly known as:  ONE A DAY Your last dose was: Your next dose is:    
   
   
 Dose:  1 Tab Take 1 Tab by mouth daily. Refills:  0  
     
   
   
   
  
 traZODone 50 mg tablet Commonly known as:  Donnetginny Zach Your last dose was: Your next dose is:    
   
   
 Dose:  50 mg Take 50 mg by mouth nightly. Refills:  0

## 2017-08-25 ENCOUNTER — OFFICE VISIT (OUTPATIENT)
Dept: NEUROLOGY | Age: 67
End: 2017-08-25

## 2017-08-25 VITALS
HEART RATE: 56 BPM | OXYGEN SATURATION: 93 % | BODY MASS INDEX: 24.23 KG/M2 | WEIGHT: 157 LBS | SYSTOLIC BLOOD PRESSURE: 142 MMHG | DIASTOLIC BLOOD PRESSURE: 84 MMHG

## 2017-08-25 DIAGNOSIS — G43.111 INTRACTABLE MIGRAINE WITH AURA WITH STATUS MIGRAINOSUS: Primary | ICD-10-CM

## 2017-08-25 RX ORDER — NORTRIPTYLINE HYDROCHLORIDE 10 MG/1
10 CAPSULE ORAL
Qty: 30 CAP | Refills: 5 | Status: SHIPPED | OUTPATIENT
Start: 2017-08-25 | End: 2020-03-09

## 2017-08-25 NOTE — PROGRESS NOTES
NEUROLOGY NEW PATIENT OFFICE CONSULTATION      8/25/2017    RE: Jono Rose         1950      REFERRED BY:  Yumiko Pond MD        CHIEF COMPLAINT:  This is Jono Rose is a 61 y.o. female right handed  who had concerns including Headache. HPI:     Since childhood, patient was having headaches, but went away in her 19's. For the past 5 yrs, patient noted headaches, will have one on the nape area and also bifrontal, daily, aching, 3/10, (+) nausea (-) vomiting (-) photophobia (-) phonophobia (-) blurred vision (+) flashes of light. (-) weakness  (-) numbness    Review of Systems   Constitutional: Negative for chills and fever. Skin: Negative for rash. All other systems reviewed and are negative    Past Medical Hx  Past Medical History:   Diagnosis Date    Arthritis     GERD (gastroesophageal reflux disease)     diet control; medication only when necessary   Benign breast mass  Insomnia    Social Hx  Social History     Social History    Marital status:      Spouse name: N/A    Number of children: N/A    Years of education: N/A     Social History Main Topics    Smoking status: Former Smoker    Smokeless tobacco: Never Used    Alcohol use 7.2 oz/week     12 Glasses of wine per week      Comment: 1-2 drinks per day    Drug use: None    Sexual activity: Not Asked     Other Topics Concern    None     Social History Narrative   2 glasses of wine/ night    Family Hx  Family History   Problem Relation Age of Onset    Cancer Mother     Stroke Father    Grandmother - migraines    ALLERGIES  Allergies   Allergen Reactions    Penicillins Unknown (comments)     Told all of her life she was allergic. Unknown if taken Keflex       CURRENT MEDS  Current Outpatient Prescriptions   Medication Sig Dispense Refill    nortriptyline (PAMELOR) 10 mg capsule Take 1 Cap by mouth nightly. 30 Cap 5    traZODone (DESYREL) 50 mg tablet Take 50 mg by mouth nightly.       CALCIUM PO Take 1 Tab by mouth daily.  multivitamin (ONE A DAY) tablet Take 1 Tab by mouth daily.  HYDROcodone-acetaminophen (NORCO) 5-325 mg per tablet Take 1 Tab by mouth every four (4) hours as needed for Pain. Max Daily Amount: 6 Tabs. 30 Tab 0    ondansetron (ZOFRAN ODT) 4 mg disintegrating tablet Take 1 Tab by mouth every eight (8) hours as needed for Nausea. 20 Tab 0           PREVIOUS WORKUP: (reviewed)  IMAGING:    CT Results (recent):  No results found for this or any previous visit. MRI Results (recent):  No results found for this or any previous visit. IR Results (recent):  No results found for this or any previous visit. VAS/US Results (recent):  No results found for this or any previous visit. LABS (reviewed)  Results for orders placed or performed during the hospital encounter of 72/73/28   METABOLIC PANEL, BASIC   Result Value Ref Range    Sodium 141 136 - 145 mmol/L    Potassium 5.0 3.5 - 5.1 mmol/L    Chloride 103 97 - 108 mmol/L    CO2 32 21 - 32 mmol/L    Anion gap 6 5 - 15 mmol/L    Glucose 94 65 - 100 mg/dL    BUN 20 6 - 20 MG/DL    Creatinine 0.76 0.55 - 1.02 MG/DL    BUN/Creatinine ratio 26 (H) 12 - 20      GFR est AA >60 >60 ml/min/1.73m2    GFR est non-AA >60 >60 ml/min/1.73m2    Calcium 10.2 (H) 8.5 - 10.1 MG/DL   CBC WITH AUTOMATED DIFF   Result Value Ref Range    WBC 8.3 3.6 - 11.0 K/uL    RBC 4.73 3.80 - 5.20 M/uL    HGB 14.6 11.5 - 16.0 g/dL    HCT 44.1 35.0 - 47.0 %    MCV 93.2 80.0 - 99.0 FL    MCH 30.9 26.0 - 34.0 PG    MCHC 33.1 30.0 - 36.5 g/dL    RDW 13.6 11.5 - 14.5 %    PLATELET 443 787 - 823 K/uL    NEUTROPHILS 58 32 - 75 %    LYMPHOCYTES 32 12 - 49 %    MONOCYTES 8 5 - 13 %    EOSINOPHILS 2 0 - 7 %    BASOPHILS 0 0 - 1 %    ABS. NEUTROPHILS 4.8 1.8 - 8.0 K/UL    ABS. LYMPHOCYTES 2.6 0.8 - 3.5 K/UL    ABS. MONOCYTES 0.7 0.0 - 1.0 K/UL    ABS. EOSINOPHILS 0.1 0.0 - 0.4 K/UL    ABS.  BASOPHILS 0.0 0.0 - 0.1 K/UL   EKG, 12 LEAD, INITIAL   Result Value Ref Range Ventricular Rate 60 BPM    Atrial Rate 60 BPM    P-R Interval 170 ms    QRS Duration 88 ms    Q-T Interval 420 ms    QTC Calculation (Bezet) 420 ms    Calculated P Axis 54 degrees    Calculated R Axis 23 degrees    Calculated T Axis 29 degrees    Diagnosis       Normal sinus rhythm  Normal ECG  No previous ECGs available  Confirmed by Nelida Patterson M.D., Thomas Jose ([id number]) on 8/15/2017 12:31:30 PM         Physical Exam:     Visit Vitals    /84    Pulse (!) 56    Wt 71.2 kg (157 lb)    SpO2 93%    BMI 24.23 kg/m2     General:  Alert, cooperative, no distress. Head:  Normocephalic, without obvious abnormality, atraumatic. Eyes:  Conjunctivae/corneas clear. Lungs:  Heart:   Non labored breathing  Regular rate and rhythm, no carotid bruits   Abdomen:   Soft, non-distended   Extremities: Extremities normal, atraumatic, no cyanosis or edema. Pulses: 2+ and symmetric all extremities. Skin: Skin color, texture, turgor normal. No rashes or lesions.   Neurologic Exam     Gen: Attention normal             Language: naming, repetition, fluency normal             Memory: intact recent and remote memory  Cranial Nerves:  I: smell Not tested   II: visual fields Full to confrontation   II: pupils Equal, round, reactive to light   II: optic disc No papilledema   III,VII: ptosis none   III,IV,VI: extraocular muscles  Full ROM   V: mastication normal   V: facial light touch sensation  normal   VII: facial muscle function   symmetric   VIII: hearing symmetric   IX: soft palate elevation  normal   XI: trapezius strength  5/5   XI: sternocleidomastoid strength 5/5   XI: neck flexion strength  5/5   XII: tongue  midline     Motor: normal bulk and tone, no tremor              Strength: 5/5 all four extremities  (+) tenderness jack occipital area  Sensory: intact to LT, PP, vibration, and JPS  Reflexes: 2+ throughout; Down going toes  Coordination: Good FTN and HTS  Gait: normal gait including tandem            Impression: Collin Renteria is a 61 y.o. female who  has a past medical history of Arthritis and GERD (gastroesophageal reflux disease). who since childhood, was having headaches, but went away in her 19's. However, for the past 5 yrs, patient noted headaches, will have one on the nape area and also bifrontal, daily, aching, 3/10, (+) nausea (-) vomiting (-) photophobia (-) phonophobia (-) blurred vision (+) flashes of light. Consideration includes migraine, with visual auras, intractable. Patient may have superimposed jack occipital neuralgia (tenderness on palpation) and tension type headache (stress with recent breast biopsy). However, due to new features of headache, patient should be evaluated for structural cause of her symptoms. Patient has high risk for close angle glaucoma so Topamax is contraindicated. RECOMMENDATIONS  1. Will order MRI of brain to look for space occupying lesion  2. Trial of Nortriptyline 10 mg QHS as migraine prophylaxis and help with insomnia  3. Discussed the need for headache diary and went through the list that can trigger headache (i.e. Alcohol, chocolate)    Follow-up Disposition:  Return in about 1 month (around 9/25/2017).         Thank you for the consultation      Anca Landeros MD  Diplomate, American Board of Psychiatry and Neurology  Diplomate, Neuromuscular Medicine  Diplomate, American Board of Electrodiagnostic Medicine    Greater than 50% of time spent counseling patient      CC: Deacon Phillip MD  Fax: [phone]

## 2017-08-25 NOTE — MR AVS SNAPSHOT
Visit Information Date & Time Provider Department Dept. Phone Encounter #  
 8[facility]25/2017 11:00 AM [facility] [phone] [id number] Follow-up Instructions Return in about 1 month (around 9/25/2017). Upcoming Health Maintenance Date Due Hepatitis C Screening 1950 DTaP/Tdap/Td series (1 - Tdap) 11/26/1973 PAP AKA CERVICAL CYTOLOGY 11/26/1973 BREAST CANCER SCRN MAMMOGRAM 11/26/2002 FOBT Q 1 YEAR AGE 50-75 11/26/2002 ZOSTER VACCINE AGE 60> 9/26/2012 INFLUENZA AGE 9 TO ADULT 8/1/2017 Allergies as of 8/25/2017  Review Complete On: 8/25/2017 By: Avinash Sam MD  
  
 Severity Noted Reaction Type Reactions Penicillins  08/15/2017    Unknown (comments) Told all of her life she was allergic. Unknown if taken Keflex Current Immunizations  Never Reviewed No immunizations on file. Not reviewed this visit You Were Diagnosed With   
  
 Codes Comments Intractable migraine with aura with status migrainosus    -  Primary ICD-10-CM: A03.346 ICD-9-CM: 346.03 Vitals BP Pulse Weight(growth percentile) SpO2 BMI OB Status 142/84 (!) 56 157 lb (71.2 kg) 93% 24.23 kg/m2 Postmenopausal  
 Smoking Status Former Smoker BMI and BSA Data Body Mass Index Body Surface Area  
 24.23 kg/m 2 1.84 m 2 Preferred Pharmacy Pharmacy Name Phone [facility] [place], [address] [place] AT [facility] [phone] Your Updated Medication List  
  
   
This list is accurate as of: 8/25/17 12:08 PM.  Always use your most recent med list.  
  
  
  
  
 CALCIUM PO Take 1 Tab by mouth daily. HYDROcodone-acetaminophen 5-325 mg per tablet Commonly known as:  Nacho Jackson Take 1 Tab by mouth every four (4) hours as needed for Pain. Max Daily Amount: 6 Tabs. multivitamin tablet Commonly known as:  ONE A DAY Take 1 Tab by mouth daily. nortriptyline 10 mg capsule Commonly known as:  PAMELOR Take 1 Cap by mouth nightly. ondansetron 4 mg disintegrating tablet Commonly known as:  ZOFRAN ODT Take 1 Tab by mouth every eight (8) hours as needed for Nausea. traZODone 50 mg tablet Commonly known as:  Len Garcia Take 50 mg by mouth nightly. Prescriptions Sent to Pharmacy Refills  
 nortriptyline (PAMELOR) 10 mg capsule 5 Sig: Take 1 Cap by mouth nightly. Class: Normal  
 Pharmacy: [facility] [address] [facility] #: [phone] Route: Oral  
  
Follow-up Instructions Return in about 1 month (around 9/25/2017). To-Do List   
 08/25/2017 Imaging:  MRI BRAIN WO CONT Please provide this summary of care documentation to your next provider. Your primary care clinician is listed as Cara Cohn. If you have any questions after today's visit, please call [phone].

## 2017-08-25 NOTE — LETTER
8/25/2017 12:16 PM 
 
Patient:  Tangela Noland YOB: 1950 Date of Visit: 8/25/2017 Dear Arleen Wiggins MD 
[address]ia [address] [place] [address] VIA Facsimile: [phone] 
 : Thank you for referring Ms. Tangela Noland to me for evaluation/treatment. Below are the relevant portions of my assessment and plan of care. If you have questions, please do not hesitate to call me. I look forward to following Ms. Ayala along with you. Sincerely, Chaim Donohue MD

## 2017-09-05 ENCOUNTER — HOSPITAL ENCOUNTER (OUTPATIENT)
Dept: MRI IMAGING | Age: 67
Discharge: HOME | End: 2017-09-05
Attending: PSYCHIATRY & NEUROLOGY
Payer: COMMERCIAL

## 2017-09-05 DIAGNOSIS — G43.111 INTRACTABLE MIGRAINE WITH AURA WITH STATUS MIGRAINOSUS: ICD-10-CM

## 2017-09-05 PROCEDURE — 70551 MRI BRAIN STEM W/O DYE: CPT

## 2017-10-03 ENCOUNTER — OFFICE VISIT (OUTPATIENT)
Dept: NEUROLOGY | Age: 67
End: 2017-10-03

## 2017-10-03 VITALS
DIASTOLIC BLOOD PRESSURE: 72 MMHG | BODY MASS INDEX: 24.38 KG/M2 | HEART RATE: 71 BPM | WEIGHT: 158 LBS | SYSTOLIC BLOOD PRESSURE: 122 MMHG | OXYGEN SATURATION: 98 %

## 2017-10-03 DIAGNOSIS — G43.111 INTRACTABLE MIGRAINE WITH AURA WITH STATUS MIGRAINOSUS: Primary | ICD-10-CM

## 2017-10-03 NOTE — LETTER
10/3/2017 9:19 AM 
 
Patient:  Iveth Hutchison YOB: 1950 Date of Visit: 10/3/2017 Dear Ashvin Molina MD 
[address]ia [address] [place] [address] VIA Facsimile: [phone] 
 : Thank you for referring Ms. Iveth Hutchison to me for evaluation/treatment. Below are the relevant portions of my assessment and plan of care. If you have questions, please do not hesitate to call me. I look forward to following Ms. Ayala along with you. Sincerely, Magdalena Sawnson MD

## 2017-10-03 NOTE — PROGRESS NOTES
Neurology Progress Note    Patient ID:  Dona Fuentes  [id number]  60 y.o.  1950      Subjective:   History:  Dona Fuentes is a 61 y.o. female who  has a past medical history of Arthritis and GERD (gastroesophageal reflux disease). who since childhood, was having headaches, but went away in her 19's. However, for the past 5 yrs, patient noted headaches, will have one on the nape area and also bifrontal, daily, aching, 3/10, (+) nausea (-) vomiting (-) photophobia (-) phonophobia (-) blurred vision (+) flashes of light. Consideration includes migraine, with visual auras, intractable. Patient may have superimposed jack occipital neuralgia (tenderness on palpation) and tension type headache (stress with recent breast biopsy). However, due to new features of headache, patient should be evaluated for structural cause of her symptoms. Patient has high risk for close angle glaucoma so Topamax is contraindicated.     Patient started Nortriptyline 10 mg QHS but made her more awake. Chiropractic manipulation seem to be helping her headaches. Still has intermittent lightheaded spells with \"buzzing in her hears\".     ROS:  Per HPI-  Otherwise the remainder of ROS was negative    Social Hx  Social History     Social History    Marital status:      Spouse name: N/A    Number of children: N/A    Years of education: N/A     Social History Main Topics    Smoking status: Former Smoker    Smokeless tobacco: Never Used    Alcohol use 7.2 oz/week     12 Glasses of wine per week      Comment: 1-2 drinks per day    Drug use: None    Sexual activity: Not Asked     Other Topics Concern    None     Social History Narrative       Meds:  Current Outpatient Prescriptions on File Prior to Visit   Medication Sig Dispense Refill    traZODone (DESYREL) 50 mg tablet Take 50 mg by mouth nightly.  CALCIUM PO Take 1 Tab by mouth daily.  multivitamin (ONE A DAY) tablet Take 1 Tab by mouth daily.       nortriptyline (PAMELOR) 10 mg capsule Take 1 Cap by mouth nightly. 30 Cap 5    HYDROcodone-acetaminophen (NORCO) 5-325 mg per tablet Take 1 Tab by mouth every four (4) hours as needed for Pain. Max Daily Amount: 6 Tabs. 30 Tab 0    ondansetron (ZOFRAN ODT) 4 mg disintegrating tablet Take 1 Tab by mouth every eight (8) hours as needed for Nausea. 20 Tab 0     No current facility-administered medications on file prior to visit. Imaging:    CT Results (recent):  No results found for this or any previous visit. MRI Results (recent):    Results from [place] encounter on 09/05/17   MRI BRAIN WO CONT   Narrative INDICATION:   Headache, chronic, new features or neuro deficit     EXAMINATION:  MRI BRAIN WO CONTRAST    COMPARISON:  None    TECHNIQUE:  MR imaging of the brain was performed with sagittal T1, axial T1,  T2, FLAIR, GRE, DWI/ADC, coronal T2.    FINDINGS:      Ventricles:  Midline, no hydrocephalus. Intracranial Hemorrhage:  None. Brain Parenchyma/Brainstem:  Several scattered foci of T2 hyperintense signal  noted throughout the supratentorial white matter. No acute infarction. Basal Cisterns:  Normal.   Flow Voids:  Normal.  Additional Comments:  N/A. Impression IMPRESSION:  Multiple scattered foci of T2 signal hyperintensity throughout the  supratentorial white matter is a nonspecific finding but may be due to chronic  small vessel ischemic changes. No acute abnormality. IR Results (recent):  No results found for this or any previous visit. VAS/US Results (recent):  No results found for this or any previous visit.     Reviewed records in [facility] and media tab today    Lab Review     Hospital Outpatient Visit on 08/15/2017   Component Date Value Ref Range Status    Sodium 08/15/2017 141  136 - 145 mmol/L Final    Potassium 08/15/2017 5.0  3.5 - 5.1 mmol/L Final    Chloride 08/15/2017 103  97 - 108 mmol/L Final    CO2 08/15/2017 32  21 - 32 mmol/L Final    Anion gap 08/15/2017 6  5 - 15 mmol/L Final    Glucose 08/15/2017 94  65 - 100 mg/dL Final    BUN 08/15/2017 20  6 - 20 MG/DL Final    Creatinine 08/15/2017 0.76  0.55 - 1.02 MG/DL Final    BUN/Creatinine ratio 08/15/2017 26* 12 - 20   Final    GFR est AA 08/15/2017 >60  >60 ml/min/1.73m2 Final    GFR est non-AA 08/15/2017 >60  >60 ml/min/1.73m2 Final    Comment: Estimated GFR is calculated using the IDMS-traceable Modification of Diet in Renal Disease (MDRD) Study equation, reported for both  Americans (GFRAA) and non- Americans (GFRNA), and normalized to 1.73m2 body surface area. The physician must decide which value applies to the patient. The MDRD study equation should only be used in individuals age 25 or older. It has not been validated for the following: pregnant women, patients with serious comorbid conditions, or on certain medications, or persons with extremes of body size, muscle mass, or nutritional status.  Calcium 08/15/2017 10.2* 8.5 - 10.1 MG/DL Final    WBC 08/15/2017 8.3  3.6 - 11.0 K/uL Final    RBC 08/15/2017 4.73  3.80 - 5.20 M/uL Final    HGB 08/15/2017 14.6  11.5 - 16.0 g/dL Final    HCT 08/15/2017 44.1  35.0 - 47.0 % Final    MCV 08/15/2017 93.2  80.0 - 99.0 FL Final    MCH 08/15/2017 30.9  26.0 - 34.0 PG Final    MCHC 08/15/2017 33.1  30.0 - 36.5 g/dL Final    RDW 08/15/2017 13.6  11.5 - 14.5 % Final    PLATELET 05/50/9353 361  150 - 400 K/uL Final    NEUTROPHILS 08/15/2017 58  32 - 75 % Final    LYMPHOCYTES 08/15/2017 32  12 - 49 % Final    MONOCYTES 08/15/2017 8  5 - 13 % Final    EOSINOPHILS 08/15/2017 2  0 - 7 % Final    BASOPHILS 08/15/2017 0  0 - 1 % Final    ABS. NEUTROPHILS 08/15/2017 4.8  1.8 - 8.0 K/UL Final    ABS. LYMPHOCYTES 08/15/2017 2.6  0.8 - 3.5 K/UL Final    ABS. MONOCYTES 08/15/2017 0.7  0.0 - 1.0 K/UL Final    ABS. EOSINOPHILS 08/15/2017 0.1  0.0 - 0.4 K/UL Final    ABS.  BASOPHILS 08/15/2017 0.0  0.0 - 0.1 K/UL Final    Ventricular Rate 08/15/2017 60  BPM Final    Atrial Rate 08/15/2017 60  BPM Final    P-R Interval 08/15/2017 170  ms Final    QRS Duration 08/15/2017 88  ms Final    Q-T Interval 08/15/2017 420  ms Final    QTC Calculation (Bezet) 08/15/2017 420  ms Final    Calculated P Axis 08/15/2017 54  degrees Final    Calculated R Axis 08/15/2017 23  degrees Final    Calculated T Axis 08/15/2017 29  degrees Final    Diagnosis 08/15/2017    Final                    Value:Normal sinus rhythm  Normal ECG  No previous ECGs available  Confirmed by Carly Esquivel M.D., Marck White ([id number]) on 8/15/2017 12:31:30 PM           Objective:       Exam:  Visit Vitals    /72    Pulse 71    Wt 71.7 kg (158 lb)    SpO2 98%    BMI 24.38 kg/m2     Gen: Awake, alert, follows commands  Appropriate appearance, normal speech. Oriented to all spheres. No visual field defect on confrontation exam.  Full eyes movement, with no nystagmus, no diplopia, no ptosis. Normal gag and swallow. All remaining cranial nerves were normal  Motor function: 5/5 in all extremities  Sensory: intact to LT, PP and JPS  Good FTN and HTS   Gait: Normal    Assessment:       ICD-10-CM ICD-9-CM    1. Intractable migraine with aura with status migrainosus G43. 111 346.03            Plan:   1. I personally reviewed the MRI brain images with the patient. Discussed MRI brain T2 non-specific white matter changes may be related to her chronic headache and history of smoking. Consider repeating MRI brain in 1-2 yrs and if patient has new or worsening symptoms  2. Offered trying her on a differ medication (i.e. Depakote) for migraine prevention, but patient declined for now. Other alternative that was discussed include Botox for chronic daily migraine but this was declined as well  3. Continue chiropractic manipulation for now since it is helping  4.  Continue headache diary and list that can trigger headache (i.e. Alcohol, chocolate)        Follow-up Disposition:  Return if symptoms worsen or fail to improve.           Joana Luna MD  Diplomate, American Board of Psychiatry and Neurology  Diplomate, Neuromuscular Medicine  Diplomate, American Board of Electrodiagnostic Medicine

## 2017-10-03 NOTE — PATIENT INSTRUCTIONS
[facility]   Statement to Patients  April 1, 2014      In an effort to ensure the large volume of patient prescription refills is processed in the most efficient and expeditious manner, we are asking our patients to assist us by calling your Pharmacy for all prescription refills, this will include also your  Mail Order Pharmacy. The pharmacy will contact our office electronically to continue the refill process. Please do not wait until the last minute to call your pharmacy. We need at least 48 hours (2days) to fill prescriptions. We also encourage you to call your pharmacy before going to  your prescription to make sure it is ready. With regard to controlled substance prescription refill requests (narcotic refills) that need to be picked up at our office, we ask your cooperation by providing us with at least 72 hours (3days) notice that you will need a refill. We will not refill narcotic prescription refill requests after 4:00pm on any weekday, Monday through Thursday, or after 2:00pm on Fridays, or on the weekends. We encourage everyone to explore another way of getting your prescription refill request processed using [employer], our patient web portal through our electronic medical record system. [employer] is an efficient and effective way to communicate your medication request directly to the office and  downloadable as an adela on your smart phone . [employer] also features a review functionality that allows you to view your medication list as well as leave messages for your physician. Are you ready to get connected? If so please review the attatched instructions or speak to any of our staff to get you set up right away! Thank you so much for your cooperation. Should you have any questions please contact our Practice Administrator.     The Physicians and Staff,  Ramon Montero [address] Neurology [facility] Statement to Patients  April 1, 2014      In an effort to ensure the large volume of patient prescription refills is processed in the most efficient and expeditious manner, we are asking our patients to assist us by calling your Pharmacy for all prescription refills, this will include also your  Mail Order Pharmacy. The pharmacy will contact our office electronically to continue the refill process. Please do not wait until the last minute to call your pharmacy. We need at least 48 hours (2days) to fill prescriptions. We also encourage you to call your pharmacy before going to  your prescription to make sure it is ready. With regard to controlled substance prescription refill requests (narcotic refills) that need to be picked up at our office, we ask your cooperation by providing us with at least 72 hours (3days) notice that you will need a refill. We will not refill narcotic prescription refill requests after 4:00pm on any weekday, Monday through Thursday, or after 2:00pm on Fridays, or on the weekends. We encourage everyone to explore another way of getting your prescription refill request processed using [employer], our patient web portal through our electronic medical record system. [employer] is an efficient and effective way to communicate your medication request directly to the office and  downloadable as an adela on your smart phone . [employer] also features a review functionality that allows you to view your medication list as well as leave messages for your physician. Are you ready to get connected? If so please review the attatched instructions or speak to any of our staff to get you set up right away! Thank you so much for your cooperation. Should you have any questions please contact our Practice Administrator.     The Physicians and Staff,  [facility]

## 2017-10-03 NOTE — MR AVS SNAPSHOT
Visit Information Date & Time Provider Department Dept. Phone Encounter #  
 10/3/2017  8:40 AM Aparna Niño MD [facility] [phone] [id number] Follow-up Instructions Return if symptoms worsen or fail to improve. Follow-up and Disposition History Upcoming Health Maintenance Date Due Hepatitis C Screening 1950 DTaP/Tdap/Td series (1 - Tdap) 11/26/1973 PAP AKA CERVICAL CYTOLOGY 11/26/1973 BREAST CANCER SCRN MAMMOGRAM 11/26/2002 FOBT Q 1 YEAR AGE 50-75 11/26/2002 ZOSTER VACCINE AGE 60> 9/26/2012 INFLUENZA AGE 9 TO ADULT 8/1/2017 Allergies as of 10/3/2017  Review Complete On: 10/3/2017 By: Schuyler Yeung LPN Severity Noted Reaction Type Reactions Penicillins  08/15/2017    Unknown (comments) Told all of her life she was allergic. Unknown if taken Keflex Current Immunizations  Never Reviewed No immunizations on file. Not reviewed this visit You Were Diagnosed With   
  
 Codes Comments Intractable migraine with aura with status migrainosus    -  Primary ICD-10-CM: C38.078 ICD-9-CM: 346.03 Vitals BP Pulse Weight(growth percentile) SpO2 BMI OB Status 122/72 71 158 lb (71.7 kg) 98% 24.38 kg/m2 Postmenopausal  
 Smoking Status Former Smoker BMI and BSA Data Body Mass Index Body Surface Area  
 24.38 kg/m 2 1.85 m 2 Preferred Pharmacy Pharmacy Name Phone [facility] [place], [address] [place] AT [facility] [phone] Your Updated Medication List  
  
   
This list is accurate as of: 10/3/17  9:21 AM.  Always use your most recent med list.  
  
  
  
  
 CALCIUM PO Take 1 Tab by mouth daily. HYDROcodone-acetaminophen 5-325 mg per tablet Commonly known as:  Lynnetta Jones Take 1 Tab by mouth every four (4) hours as needed for Pain. Max Daily Amount: 6 Tabs. multivitamin tablet Commonly known as:  ONE A DAY Take 1 Tab by mouth daily. nortriptyline 10 mg capsule Commonly known as:  PAMELOR Take 1 Cap by mouth nightly. ondansetron 4 mg disintegrating tablet Commonly known as:  ZOFRAN ODT Take 1 Tab by mouth every eight (8) hours as needed for Nausea. traZODone 50 mg tablet Commonly known as:  [place] Dessert Take 50 mg by mouth nightly. Follow-up Instructions Return if symptoms worsen or fail to improve. Patient Instructions PRESCRIPTION REFILL POLICY [facility] Statement to Patients April 1, 2014 In an effort to ensure the large volume of patient prescription refills is processed in the most efficient and expeditious manner, we are asking our patients to assist us by calling your Pharmacy for all prescription refills, this will include also your  Mail Order Pharmacy. The pharmacy will contact our office electronically to continue the refill process. Please do not wait until the last minute to call your pharmacy. We need at least 48 hours (2days) to fill prescriptions. We also encourage you to call your pharmacy before going to  your prescription to make sure it is ready. With regard to controlled substance prescription refill requests (narcotic refills) that need to be picked up at our office, we ask your cooperation by providing us with at least 72 hours (3days) notice that you will need a refill. We will not refill narcotic prescription refill requests after 4:00pm on any weekday, Monday through Thursday, or after 2:00pm on Fridays, or on the weekends. We encourage everyone to explore another way of getting your prescription refill request processed using [employer], our patient web portal through our electronic medical record system.  [employer] is an efficient and effective way to communicate your medication request directly to the office and downloadable as an adela on your smart phone . [employer] also features a review functionality that allows you to view your medication list as well as leave messages for your physician. Are you ready to get connected? If so please review the attatched instructions or speak to any of our staff to get you set up right away! Thank you so much for your cooperation. Should you have any questions please contact our Practice Administrator. The Physicians and Staff,  [facility] PRESCRIPTION REFILL POLICY [facility] Statement to Patients April 1, 2014 In an effort to ensure the large volume of patient prescription refills is processed in the most efficient and expeditious manner, we are asking our patients to assist us by calling your Pharmacy for all prescription refills, this will include also your  Mail Order Pharmacy. The pharmacy will contact our office electronically to continue the refill process. Please do not wait until the last minute to call your pharmacy. We need at least 48 hours (2days) to fill prescriptions. We also encourage you to call your pharmacy before going to  your prescription to make sure it is ready. With regard to controlled substance prescription refill requests (narcotic refills) that need to be picked up at our office, we ask your cooperation by providing us with at least 72 hours (3days) notice that you will need a refill. We will not refill narcotic prescription refill requests after 4:00pm on any weekday, Monday through Thursday, or after 2:00pm on Fridays, or on the weekends. We encourage everyone to explore another way of getting your prescription refill request processed using [employer], our patient web portal through our electronic medical record system.  [employer] is an efficient and effective way to communicate your medication request directly to the office and downloadable as an adela on your smart phone . [employer] also features a review functionality that allows you to view your medication list as well as leave messages for your physician. Are you ready to get connected? If so please review the attatched instructions or speak to any of our staff to get you set up right away! Thank you so much for your cooperation. Should you have any questions please contact our Practice Administrator. The Physicians and Staff,  [facility] Please provide this summary of care documentation to your next provider. Your primary care clinician is listed as Kishor Chisholm. If you have any questions after today's visit, please call [phone].

## 2018-10-09 RX ORDER — MINOCYCLINE HYDROCHLORIDE 50 MG/1
50 TABLET ORAL DAILY
COMMUNITY
End: 2020-03-09

## 2018-10-17 ENCOUNTER — HOSPITAL ENCOUNTER (OUTPATIENT)
Age: 68
Setting detail: OUTPATIENT SURGERY
Discharge: HOME | End: 2018-10-17
Attending: SPECIALIST | Admitting: SPECIALIST
Payer: COMMERCIAL

## 2018-10-17 ENCOUNTER — ANESTHESIA EVENT (OUTPATIENT)
Dept: ENDOSCOPY | Age: 68
End: 2018-10-17
Payer: COMMERCIAL

## 2018-10-17 ENCOUNTER — ANESTHESIA (OUTPATIENT)
Dept: ENDOSCOPY | Age: 68
End: 2018-10-17
Payer: COMMERCIAL

## 2018-10-17 VITALS
DIASTOLIC BLOOD PRESSURE: 61 MMHG | HEART RATE: 56 BPM | BODY MASS INDEX: 24.8 KG/M2 | SYSTOLIC BLOOD PRESSURE: 115 MMHG | HEIGHT: 67 IN | WEIGHT: 158 LBS | RESPIRATION RATE: 13 BRPM | OXYGEN SATURATION: 100 % | TEMPERATURE: 97.6 F

## 2018-10-17 PROCEDURE — 88305 TISSUE EXAM BY PATHOLOGIST: CPT | Performed by: SPECIALIST

## 2018-10-17 PROCEDURE — 76040000019: Performed by: SPECIALIST

## 2018-10-17 PROCEDURE — C1726 CATH, BAL DIL, NON-VASCULAR: HCPCS | Performed by: SPECIALIST

## 2018-10-17 PROCEDURE — 74011250636 HC RX REV CODE- 250/636: Performed by: PHYSICIAN ASSISTANT

## 2018-10-17 PROCEDURE — 74011250636 HC RX REV CODE- 250/636

## 2018-10-17 PROCEDURE — 74011000250 HC RX REV CODE- 250

## 2018-10-17 PROCEDURE — 77030009426 HC FCPS BIOP ENDOSC BSC -B: Performed by: SPECIALIST

## 2018-10-17 PROCEDURE — 76060000031 HC ANESTHESIA FIRST 0.5 HR: Performed by: SPECIALIST

## 2018-10-17 RX ORDER — SODIUM CHLORIDE 9 MG/ML
50 INJECTION, SOLUTION INTRAVENOUS CONTINUOUS
Status: DISCONTINUED | OUTPATIENT
Start: 2018-10-17 | End: 2018-10-17 | Stop reason: HOSPADM

## 2018-10-17 RX ORDER — DEXTROMETHORPHAN/PSEUDOEPHED 2.5-7.5/.8
1.2 DROPS ORAL
Status: DISCONTINUED | OUTPATIENT
Start: 2018-10-17 | End: 2018-10-17 | Stop reason: HOSPADM

## 2018-10-17 RX ORDER — SODIUM CHLORIDE 9 MG/ML
INJECTION, SOLUTION INTRAVENOUS
Status: DISCONTINUED | OUTPATIENT
Start: 2018-10-17 | End: 2018-10-17 | Stop reason: HOSPADM

## 2018-10-17 RX ORDER — PANTOPRAZOLE SODIUM 20 MG/1
20 TABLET, DELAYED RELEASE ORAL
Qty: 90 TAB | Refills: 3 | Status: SHIPPED | OUTPATIENT
Start: 2018-10-17 | End: 2020-03-09

## 2018-10-17 RX ORDER — FLUMAZENIL 0.1 MG/ML
0.2 INJECTION INTRAVENOUS
Status: DISCONTINUED | OUTPATIENT
Start: 2018-10-17 | End: 2018-10-17 | Stop reason: HOSPADM

## 2018-10-17 RX ORDER — FENTANYL CITRATE 50 UG/ML
25 INJECTION, SOLUTION INTRAMUSCULAR; INTRAVENOUS AS NEEDED
Status: DISCONTINUED | OUTPATIENT
Start: 2018-10-17 | End: 2018-10-17 | Stop reason: HOSPADM

## 2018-10-17 RX ORDER — NALOXONE HYDROCHLORIDE 0.4 MG/ML
0.4 INJECTION, SOLUTION INTRAMUSCULAR; INTRAVENOUS; SUBCUTANEOUS
Status: DISCONTINUED | OUTPATIENT
Start: 2018-10-17 | End: 2018-10-17 | Stop reason: HOSPADM

## 2018-10-17 RX ORDER — LIDOCAINE HYDROCHLORIDE 20 MG/ML
INJECTION, SOLUTION EPIDURAL; INFILTRATION; INTRACAUDAL; PERINEURAL AS NEEDED
Status: DISCONTINUED | OUTPATIENT
Start: 2018-10-17 | End: 2018-10-17 | Stop reason: HOSPADM

## 2018-10-17 RX ORDER — MIDAZOLAM HYDROCHLORIDE 1 MG/ML
.25-5 INJECTION, SOLUTION INTRAMUSCULAR; INTRAVENOUS AS NEEDED
Status: DISCONTINUED | OUTPATIENT
Start: 2018-10-17 | End: 2018-10-17 | Stop reason: HOSPADM

## 2018-10-17 RX ORDER — ATROPINE SULFATE 0.1 MG/ML
0.5 INJECTION INTRAVENOUS
Status: DISCONTINUED | OUTPATIENT
Start: 2018-10-17 | End: 2018-10-17 | Stop reason: HOSPADM

## 2018-10-17 RX ORDER — PROPOFOL 10 MG/ML
INJECTION, EMULSION INTRAVENOUS
Status: DISCONTINUED | OUTPATIENT
Start: 2018-10-17 | End: 2018-10-17 | Stop reason: HOSPADM

## 2018-10-17 RX ORDER — EPINEPHRINE 0.1 MG/ML
1 INJECTION INTRACARDIAC; INTRAVENOUS
Status: DISCONTINUED | OUTPATIENT
Start: 2018-10-17 | End: 2018-10-17 | Stop reason: HOSPADM

## 2018-10-17 RX ORDER — GLYCOPYRROLATE 0.2 MG/ML
INJECTION INTRAMUSCULAR; INTRAVENOUS AS NEEDED
Status: DISCONTINUED | OUTPATIENT
Start: 2018-10-17 | End: 2018-10-17 | Stop reason: HOSPADM

## 2018-10-17 RX ORDER — PROPOFOL 10 MG/ML
INJECTION, EMULSION INTRAVENOUS AS NEEDED
Status: DISCONTINUED | OUTPATIENT
Start: 2018-10-17 | End: 2018-10-17 | Stop reason: HOSPADM

## 2018-10-17 RX ADMIN — PROPOFOL 40 MG: 10 INJECTION, EMULSION INTRAVENOUS at 07:31

## 2018-10-17 RX ADMIN — SODIUM CHLORIDE 50 ML/HR: 900 INJECTION, SOLUTION INTRAVENOUS at 07:13

## 2018-10-17 RX ADMIN — GLYCOPYRROLATE 0.1 MG: 0.2 INJECTION INTRAMUSCULAR; INTRAVENOUS at 07:16

## 2018-10-17 RX ADMIN — LIDOCAINE HYDROCHLORIDE 50 MG: 20 INJECTION, SOLUTION EPIDURAL; INFILTRATION; INTRACAUDAL; PERINEURAL at 07:16

## 2018-10-17 RX ADMIN — SODIUM CHLORIDE: 9 INJECTION, SOLUTION INTRAVENOUS at 07:05

## 2018-10-17 RX ADMIN — PROPOFOL 40 MG: 10 INJECTION, EMULSION INTRAVENOUS at 07:25

## 2018-10-17 RX ADMIN — PROPOFOL 100 MG: 10 INJECTION, EMULSION INTRAVENOUS at 07:18

## 2018-10-17 RX ADMIN — PROPOFOL 75 MCG/KG/MIN: 10 INJECTION, EMULSION INTRAVENOUS at 07:18

## 2018-10-17 NOTE — PROCEDURES
[address] [facility] PALMER Weathers MD  [phone]      2018    Esophagogastroduodenoscopy & Colonoscopy Procedure Note  Francis Arevalo  : 1950  [facility] Medical Record Number: [id number]      Indications:    Dysphagia/odynophagia, GERD Family history of coloretal cancer (screening only) , Hematochezia/melena  and Screening Colonoscopy   Referring Physician:  Bree Fowler MD  Anesthesia/Sedation: Conscious Sedation/Moderate Sedation/MAC  Endoscopist:  Dr. French Olvera  Complications:  None  Estimated Blood Loss:  None    Permit:  The indications, risks, benefits and alternatives were reviewed with the patient or their decision maker who was provided an opportunity to ask questions and all questions were answered. The specific risks of esophagogastroduodenoscopy with conscious sedation were reviewed, including but not limited to anesthetic complication, bleeding, adverse drug reaction, missed lesion, infection, IV site reactions, and intestinal perforation which would lead to the need for surgical repair. Alternatives to EGD and colonoscopy including radiographic imaging, observation without testing, or laboratory testing were reviewed as well as the limitations of those alternatives discussed. After considering the options and having all their questions answered, the patient or their decision maker provided both verbal and written consent to proceed. -----------EGD------------   Procedure in Detail:  After obtaining informed consent, positioning of the patient in the left lateral decubitus position, and conduction of a pre-procedure pause or \"time out\" the endoscope was introduced into the mouth and advanced to the duodenum. A careful inspection was made, and findings or interventions are described below.     Findings:   Esophagus:Small hiatal hernia above which there is an acquired ring and LA class B erosive esophagitis. Cold forceps biopsies and then dilation with 18mm-20mm balloon are achieved. Stomach: normal   Duodenum/jejunum: normal        ----------Colonoscopy-----------    Procedure in Detail:  After obtaining informed consent, positioning of the patient in the left lateral decubitus position, and conduction of a pre-procedure pause or \"time out\" the endoscope was introduced into the anus and advanced to the cecum, which was identified by the ileocecal valve and appendiceal orifice. The quality of the colonic preparation was good. A careful inspection was made as the colonoscope was withdrawn, findings and interventions are described below. Findings: There is diverticulosis in the sigmoid colon without complications such as bleeding, inflammatory change, or luminal narrowing. In the rectum, medium internal hemorrhoids are noted without bleeding.      ------------------------------  Specimens:    See above    Complications:   None; patient tolerated the procedure well. Impressions:  EGD:  Ring, hiatus hernia, and esophagitis. Colonoscopy: Normal colonoscopy to the cecum, with no evidence of neoplasia, diverticular disease, or mucosal abnormality. Recommendations:     - Await pathology. - PPI therapy and GERD lifestyle modifications    - For persistent hemorrhoid bleeding - high fiber diet and consider referral for hemorrhoidectomy. Thank you for entrusting me with this patient's care. Please do not hesitate to contact me with any questions or if I can be of assistance with any of your other patients' GI needs.     Signed By: Fabio Sibley MD                        October 17, 2018    Surgical assistant none

## 2018-10-17 NOTE — ANESTHESIA POSTPROCEDURE EVALUATION
Procedure(s): 
COLONOSCOPY 
ESOPHAGOGASTRODUODENOSCOPY (EGD) ESOPHAGOGASTRODUODENAL (EGD) BIOPSY 
ESOPHAGEAL DILATION. Anesthesia Post Evaluation Multimodal analgesia: multimodal analgesia not used between 6 hours prior to anesthesia start to PACU discharge Patient location during evaluation: bedside Patient participation: complete - patient participated Level of consciousness: awake Pain score: 0 Pain management: adequate Airway patency: patent Anesthetic complications: no 
Cardiovascular status: acceptable Respiratory status: acceptable Hydration status: acceptable Visit Vitals /61 Pulse (!) 56 Temp 36.4 °C (97.6 °F) Resp 13 Ht 5' 7\" (1.702 m) Wt 71.7 kg (158 lb) SpO2 100% Breastfeeding? No  
BMI 24.75 kg/m²

## 2018-10-17 NOTE — ANESTHESIA PREPROCEDURE EVALUATION
Anesthetic History PONV Review of Systems / Medical History Patient summary reviewed, nursing notes reviewed and pertinent labs reviewed Pulmonary Pertinent negatives: No recent URI and smoker Neuro/Psych Headaches Cardiovascular Within defined limits Exercise tolerance: >4 METS 
  
GI/Hepatic/Renal 
  
GERD (no symptoms currently): well controlled Endo/Other Arthritis Other Findings Comments: 2 glasses wine daily Physical Exam 
 
Airway Mallampati: I 
TM Distance: 4 - 6 cm Neck ROM: normal range of motion Mouth opening: Normal 
 
 Cardiovascular Rhythm: regular Rate: normal 
 
 
 
 Dental 
No notable dental hx Pulmonary Breath sounds clear to auscultation Abdominal 
GI exam deferred Other Findings Anesthetic Plan ASA: 2 Anesthesia type: MAC Induction: Intravenous Anesthetic plan and risks discussed with: Patient

## 2018-10-17 NOTE — H&P
Date: 10/8/2018 9:30 AM   Patient Name: Pk Ayala   Account #: [id number]    Gender: Female    (age): 1950 (74)       Provider:     Shyla Vasquez. Tammy Brizuela        Referring Physician:     Daniel Munguia MD  [facility] [address]  [phone] (phone)  [phone] (fax)        Chief Complaint: f/u visit           History of Present Illness:   Patient is here today for a follow up visit. She states that she continues to have intermittent hemorrhoid symptoms. No further bleeding but complaints of rectal pressure and rectal pain. Stopped using Trulance and Linzess. Currently using Miralax and that keeps her bowel movements regular. She is having soft stool. Therapy in past for hemorrhoids has included medication as above to help with bowel regimen and also hydrocortisone suppositories. Also with complaints of \"indigestion\". States he has been off and on for some time but more frequent within the past week. Symptoms include: heartburn and intermittent dysphagia to solids. Triggers: red sauce, laying down too soon after meals, and coffee. Has tried OTC H2 antagonists with minimal relief. Symptoms better controlled with GERD diet and apple cider vinegar as needed. No prior EGD. ? Patient is here today for a follow up visit. She states that she continues to have intermittent hemorrhoid symptoms. No further bleeding but complaints of rectal pressure and rectal pain. Stopped using Trulance and Linzess. Currently using Miralax and that keeps her bowel movements regular. She is having soft stool. Therapy in past for hemorrhoids has included medication as above to help with bowel regimen and also hydrocortisone suppositories. Also with complaints of \"indigestion\". States he has been off and on for some time but more frequent within the past week. Symptoms include: heartburn and intermittent dysphagia to solids. Triggers: red sauce, laying down too soon after meals, and coffee. Has tried OTC H2 antagonists with minimal relief. Symptoms better controlled with GERD diet and apple cider vinegar as needed. No prior EGD. ? Past Medical History      Medical Conditions: Arthritis  At Risk For Glaucoma   Surgical Procedures: Tubal Ligation/Sling, 9/2015   Dx Studies: Breast Ultrasound  Colonoscopy, 2012  Colonoscopy, 2/9/2017, Internal hemorrhoids  CT Scan, 11/2017  Pre-Procedure Call, 2/1/2017   Medications: hydrocortisone acetate 25 mg Insert 1 suppository into rectum every night for 28 days  Linzess 145 mcg Take 1 capsule by mouth every morning  minocycline 50 mg TK 1 T PO D FOR ROSACEA  trazodone 50 mg Take 2 tablets by mouth at bedtime  Trulance 3 mg Take 1 tablet by mouth once a day for 90 days  Trulance 3 mg Take 1 tablet by mouth once a day   Allergies: Penicillins   Immunizations: Flu vaccine, 11/2015  zoster, ? age 63      Social History      Alcohol: Beer 1 drink. Consume Alcohol 1 times a week. Tobacco: Former smokerCigarettes, quit 1981. Drugs: None   Exercise: Cardio 30-60 mins 5 times a week. Weight Training 20 mins 2 times a week. Caffeine: 3. Coffee or Tea # of cups per day: Savi Louis Marital Status:          Occupation:               Family History No history of Esophogeal Cancer, GI Cancers, IBD (Crohn's or UC), Liver disease  Sister: Diagnosed with Family history of colon polyps; Mother: Diagnosed with Family history of colon cancer;       Review of Systems:   Cardiovascular: Denies chest pain, irregular heart beat, palpitations, peripheral edema, syncope, Sweats. Constitutional: Denies fatigue, fever, loss of appetite, weight gain, weight loss. ENMT: Denies nose bleeds, sore throat, hearing loss. Endocrine: Denies excessive thirst, heat intolerance. Eyes: Denies loss of vision.    Gastrointestinal: Denies abdominal pain, abdominal swelling, change in bowel habits, constipation, diarrhea, Bloating/gas, heartburn, jaundice, nausea, rectal bleeding, stomach cramps, vomiting, dysphagia, rectal pain, Stool incontinence, hematemesis. Genitourinary: Denies dark urine, dysuria, frequent urination, hematuria, incontinence. Hematologic/Lymphatic: Denies easy bruising, prolonged bleeding. Integumentary: Denies itching, rashes, sun sensitivity. Musculoskeletal: Denies arthritis, back pain, gout, joint pain, muscle weakness, stiffness. Neurological: Denies dizziness, fainting, frequent headaches, memory loss. Psychiatric: Denies anxiety, depression, difficulty sleeping, hallucinations, nervousness, panic attacks, paranoia. Respiratory: Denies cough, dyspnea, wheezing. Vital Signs:   BP  (mmHg)  Pulse  (ppm) Weight (lbs/oz) Height (ft/in) BMI   108/72 76 162 /  5 / 8 24.63      Physical Exam:   Constitutional:      Appearance: No distress, appears comfortable. Communication: Understands/receives spoken information. Head/face: Inspection: Normacephalic, atraumatic. Palpation: normal.   Eyes:      Conjunctivae/lids: Normal.   Pupils/irises: Pupils equal, round and normal.   ENMT:      External: Normal.   Hearing: Normal.   Respiratory:      Effort: Normal respiratory effort, comfortable, speaks in complete sentences. Auscultation: normal breath sounds, no rubs, wheezes or rhonchi. Cardiovascular: Auscultation: normal, S1 and S2, no gallops , no rubs or murmurs . Gastrointestinal/Abdomen:      Abdomen: non-distended, nontender. Rectal Exam: residual hemorrhoid skin tag, no anal fissure; LORRAINE - fullness noted but no palpable mass. Mild discomfort during exam. Clear mucous on glove. Shanique Chung Psychiatric:      Judgment/insight: Normal, normal judgement, normal insight. Orientation: oriented to time, space and person. Memory: normal short term memory, normal long term memory, no memory loss. Mood and affect: Normal mood, affect full, no evidence of depression, anxiety or agitation. Lab Results:      Test 1/29/2018 Units Limits   Comp.  Metabolic Panel (14)      A/G Ratio 2.2  1.2 - 2.2   Albumin, Serum 4.9 g/dL 3.6 - 4.8   Alkaline Phosphatase, S 87 IU/L 39 - 117   ALT (SGPT) 21 IU/L 0 - 32   AST (SGOT) 28 IU/L 0 - 40   Bilirubin, Total 0.3 mg/dL 0 - 1.2   BUN 15 mg/dL 8 - 27   BUN/Creatinine Ratio 18  12 - 28   Calcium, Serum 10.2 mg/dL 8.7 - 10.3   Carbon Dioxide, Total 25 mmol/L 18 - 29   Chloride, Serum 97 mmol/L 96 - 106   Creatinine, Serum 0.82 mg/dL 0.57 - 1   eGFR If Africn Am 87 mL/min/1.73 > 59   eGFR If NonAfricn Am 75 mL/min/1.73 > 59   Globulin, Total 2.2 g/dL 1.5 - 4.5   Glucose, Serum 88 mg/dL 65 - 99   Potassium, Serum 3.9 mmol/L 3.5 - 5.2   Protein, Total, Serum 7.1 g/dL 6 - 8.5   Sodium, Serum 141 mmol/L 134 - 144     Impressions: Rectal pain  Other hemorrhoids  Heartburn  Dysphagia  Family history of malignant neoplasm of gastrointestinal tract? ?Rectal pain? ?  ? ? Other hemorrhoids? ?  ? ? Heartburn? ?  ? ? Dysphagia? ?  ? ? Family history of malignant neoplasm of gastrointestinal tract? Assessment: Persistent hemorrhoid symptoms and she is not responding to conservative management. She may need hemorrhoidectomy but will repeat colonoscopy (especially given family history of colon cancer) and ensure no alternative etiology. EGD for upper GI symptoms. ?Persistent hemorrhoid symptoms and she is not responding to conservative management. She may need hemorrhoidectomy but will repeat colonoscopy (especially given family history of colon cancer) and ensure no alternative etiology. EGD for upper GI symptoms. Plan: EGD  Upper Endoscopy (EGD): options, risks, benefits and complications of bleeding, tear, surgical repair (1:1000) and reaction to medication, aspiration, and pneumonia explained to patient. 5% chance of missing lesions explained. All questions answered.      Colonoscopy - Miralax prep  Colonoscopy/Biopsy/Polypectomy: options, risks, benefits and complications of bleeding, tear, surgical repair (1:1000) & 1:100 post Polypectomy, and reaction of medication, aspiration, Pneumonia explained to patient, 5% chance of missing colon cancer and other lesions explained. All questions answered. Education: GERD Diet     Schedule your next visit with Dr. George Webster  Every third office visit? ?EGD? ?  ? ? Upper Endoscopy (EGD): options, risks, benefits and complications of bleeding, tear, surgical repair (1:1000) and reaction to medication, aspiration, and pneumonia explained to patient. 5% chance of missing lesions explained. All questions answered. ?   ?  ? ? Colonoscopy - Miralax prep? ?  ? ? Colonoscopy/Biopsy/Polypectomy: options, risks, benefits and complications of bleeding, tear, surgical repair (1:1000) & 1:100 post Polypectomy, and reaction of medication, aspiration, Pneumonia explained to patient, 5% chance of missing colon cancer and other lesions explained. All questions answered. ?   ?  ? ? Education: GERD Diet?   ?  ? ? Schedule your next visit with Dr. George Webster? ?  ? ? Every third office visit? Risk & Medical Necessity: The patient requires Moderate to High Severity care for this visit. Diagnosis and management options are Extensive. The amount of data reviewed and/or ordered is Minimal/None. The level of risk is Moderate. Notes: Dr. George Webster was available for consultation during this visit. Jose Pierre. Sera Lopez PA-C     Electronically signed on 10/8/2018 10:01:58 AM by Jose Pierre. Sera Lopez, [address] [place] [address], MRN [id number],  1950 Follow Up, 2018

## 2018-10-17 NOTE — PERIOP NOTES
CRE balloon dilatation of the esophagus   18 mm Balloon inflated to 3 ATMs   19 mm Balloon inflated to 4.5 ATMs  20 mm Balloon inflated to 6 ATMs   No subcutaneous crepitus of the chest or cervical region was noted post dilatation.

## 2018-10-17 NOTE — PERIOP NOTES
Procedure being performed under MAC; Kenneth Cifuentes CRNA at bedside monitoring patient at [id number]. See anesthesia notes. Endoscope was pre-cleaned at bedside immediately following procedure by Vinod Yang. at 2861. Care of patient assumed from the anesthesia provider at [phone]. Patient tolerated procedure well. Abdomen remains soft and non tender post procedure, no complaints or indication of discomfort noted at this time. See anesthesia note. Patient transferred to Endoscopy Recovery and report given to recovery nurse [facility] recovery room RN.

## 2018-10-17 NOTE — INTERVAL H&P NOTE
Pre-Endoscopy H&P Update  Chief complaint/HPI/ROS:  The indication for the procedure, the patient's history and the patient's current medications are reviewed prior to the procedure and that data is reported on the H&P to which this document is attached. Any significant complaints with regard to organ systems will be noted, and if not mentioned then a review of systems is not contributory. Past Medical History:   Diagnosis Date    Arthritis     GERD (gastroesophageal reflux disease)     diet control; medication only when necessary      Past Surgical History:   Procedure Laterality Date    ABDOMEN SURGERY PROC UNLISTED      sling per pt    HX COLONOSCOPY  2017    HX HEENT      wisdom teeth    HX ORTHOPAEDIC Right     hand \"tendon rolled up and put between the bones\" per pt    HX TONSILLECTOMY      HX TUBAL LIGATION       Social   Social History     Tobacco Use    Smoking status: Former Smoker    Smokeless tobacco: Never Used   Substance Use Topics    Alcohol use: Yes     Alcohol/week: 7.2 oz     Types: 12 Glasses of wine per week     Comment: 1-2 drinks per day      Family History   Problem Relation Age of Onset    Cancer Mother     Stroke Father       Allergies   Allergen Reactions    Penicillins Unknown (comments)     Told all of her life she was allergic. Unknown if taken Keflex      Prior to Admission Medications   Prescriptions Last Dose Informant Patient Reported? Taking? CALCIUM PO 10/10/2018 at Unknown time Self Yes Yes   Sig: Take 1 Tab by mouth daily. D-MANNOSE PO Unknown at Unknown time  Yes No   Sig: Take  by mouth.   minocycline (DYNACIN) 50 mg tablet 10/16/2018 at am  Yes Yes   Sig: Take 50 mg by mouth daily. multivitamin (ONE A DAY) tablet 10/15/2018 at am Self Yes Yes   Sig: Take 1 Tab by mouth daily. nortriptyline (PAMELOR) 10 mg capsule   No Yes   Sig: Take 1 Cap by mouth nightly.    omega 3-dha-epa-fish oil (FISH OIL) 100-160-1,000 mg cap 10/14/2018 at am  Yes Yes   Sig: Take  by mouth. traZODone (DESYREL) 50 mg tablet 10/16/2018 at pm Self Yes Yes   Sig: Take 50 mg by mouth nightly. Facility-Administered Medications: None       PHYSICAL EXAM:  The patient is examined immediately prior to the procedure. Visit Vitals  /62   Pulse 63   Temp 98.2 °F (36.8 °C)   Resp 10   Ht 5' 7\" (1.702 m)   Wt 71.7 kg (158 lb)   SpO2 100%   Breastfeeding? No   BMI 24.75 kg/m²     Gen: Appears comfortable, no distress. Pulm: comfortable respirations with no abnormal audible breath sounds  HEART: well perfused, no abnormal audible heart sounds  GI: abdomen flat. PLAN:  Informed consent discussion held, patient afforded an opportunity to ask questions and all questions answered. After being advised of the risks, benefits, and alternatives, the patient requested that we proceed and indicated so on a written consent form. Will proceed with procedure as planned.   Ty Hahn MD

## 2019-04-01 ENCOUNTER — HOSPITAL ENCOUNTER (OUTPATIENT)
Dept: MRI IMAGING | Age: 69
Discharge: HOME | End: 2019-04-01
Attending: ORTHOPAEDIC SURGERY
Payer: MEDICARE

## 2019-04-01 DIAGNOSIS — M23.304 DERANGEMENT OF MEDIAL MENISCUS OF LEFT KNEE: ICD-10-CM

## 2019-04-01 DIAGNOSIS — M94.262 CHONDROMALACIA OF LEFT KNEE: ICD-10-CM

## 2019-04-01 PROCEDURE — 73721 MRI JNT OF LWR EXTRE W/O DYE: CPT

## 2020-07-02 NOTE — PROGRESS NOTES
[address] [place] [address] Dr Green Preprocedure Instructions      1. On the day of your surgery, please report to registration located on the 2nd floor of the  [facility]. yes    2. You must have a responsible adult to drive you to the hospital, stay at the hospital during your procedure and drive you home. If they leave your procedure will not be started (no exceptions). yes    3. Do not have anything to eat or drink (including water, gum, mints, coffee, and juice) after midnight. This does not apply to the medications you were instructed to take by your physician. yesIf you are currently taking Plavix, Coumadin, Aspirin, or other blood-thinning agents, contact your physician for special instructions. not applicable,    4. If you are having a procedure that requires bowel prep: We recommend that you have only clear liquids the day before your procedure and begin your bowel prep by 5:00 pm.  You may continue to drink clear liquids until midnight. If for any reason you are not able to complete your prep please notify your physician immediately. not applicable    5. Have a list of all current medications, including vitamins, herbal supplements and any other over the counter medications. yes    6. If you wear glasses, contacts, dentures and/or hearing aids, they may be removed prior to procedure, please bring a case to store them in. yes    7. You should understand that if you do not follow these instructions your procedure may be cancelled. If your physical condition changes (I.e. fever, cold or flu) please contact your doctor as soon as possible. 8. It is important that you be on time.   If for any reason you are unable to keep your appointment please call [phone] the day of or your physicians office prior to your scheduled procedure

## 2020-07-06 ENCOUNTER — OFFICE VISIT (OUTPATIENT)
Dept: PRIMARY CARE CLINIC | Age: 70
End: 2020-07-06

## 2020-07-06 DIAGNOSIS — Z01.818 PREOP TESTING: Primary | ICD-10-CM

## 2020-07-08 LAB — SARS-COV-2, NAA: NOT DETECTED

## 2020-07-09 NOTE — PROGRESS NOTES
You had a NEGATIVE COVID test. You still need to use good handwashing, wear a mask in public indoor places and practice social distancing. If you need anything please contact the office.    Francoise León, ANP

## 2020-07-10 ENCOUNTER — ANESTHESIA (OUTPATIENT)
Dept: ENDOSCOPY | Age: 70
End: 2020-07-10
Payer: MEDICARE

## 2020-07-10 ENCOUNTER — ANESTHESIA EVENT (OUTPATIENT)
Dept: ENDOSCOPY | Age: 70
End: 2020-07-10
Payer: MEDICARE

## 2020-07-10 ENCOUNTER — HOSPITAL ENCOUNTER (OUTPATIENT)
Age: 70
Setting detail: OUTPATIENT SURGERY
Discharge: HOME | End: 2020-07-10
Attending: SPECIALIST | Admitting: SPECIALIST
Payer: MEDICARE

## 2020-07-10 VITALS
WEIGHT: 165.34 LBS | HEART RATE: 69 BPM | OXYGEN SATURATION: 99 % | TEMPERATURE: 97.6 F | SYSTOLIC BLOOD PRESSURE: 135 MMHG | RESPIRATION RATE: 16 BRPM | BODY MASS INDEX: 25.06 KG/M2 | DIASTOLIC BLOOD PRESSURE: 79 MMHG | HEIGHT: 68 IN

## 2020-07-10 PROCEDURE — 74011250636 HC RX REV CODE- 250/636: Performed by: NURSE ANESTHETIST, CERTIFIED REGISTERED

## 2020-07-10 PROCEDURE — 76060000031 HC ANESTHESIA FIRST 0.5 HR: Performed by: SPECIALIST

## 2020-07-10 PROCEDURE — 74011250636 HC RX REV CODE- 250/636: Performed by: SPECIALIST

## 2020-07-10 PROCEDURE — C1726 CATH, BAL DIL, NON-VASCULAR: HCPCS | Performed by: SPECIALIST

## 2020-07-10 PROCEDURE — 88305 TISSUE EXAM BY PATHOLOGIST: CPT

## 2020-07-10 PROCEDURE — 77030018712 HC DEV BLLN INFL BSC -B: Performed by: SPECIALIST

## 2020-07-10 PROCEDURE — 76040000019: Performed by: SPECIALIST

## 2020-07-10 PROCEDURE — 77030021593 HC FCPS BIOP ENDOSC BSC -A: Performed by: SPECIALIST

## 2020-07-10 RX ORDER — SODIUM CHLORIDE 9 MG/ML
INJECTION, SOLUTION INTRAVENOUS
Status: DISCONTINUED | OUTPATIENT
Start: 2020-07-10 | End: 2020-07-10 | Stop reason: HOSPADM

## 2020-07-10 RX ORDER — NALOXONE HYDROCHLORIDE 0.4 MG/ML
0.4 INJECTION, SOLUTION INTRAMUSCULAR; INTRAVENOUS; SUBCUTANEOUS
Status: DISCONTINUED | OUTPATIENT
Start: 2020-07-10 | End: 2020-07-10 | Stop reason: HOSPADM

## 2020-07-10 RX ORDER — MIDAZOLAM HYDROCHLORIDE 1 MG/ML
.25-5 INJECTION, SOLUTION INTRAMUSCULAR; INTRAVENOUS AS NEEDED
Status: DISCONTINUED | OUTPATIENT
Start: 2020-07-10 | End: 2020-07-10 | Stop reason: HOSPADM

## 2020-07-10 RX ORDER — FLUMAZENIL 0.1 MG/ML
0.2 INJECTION INTRAVENOUS
Status: DISCONTINUED | OUTPATIENT
Start: 2020-07-10 | End: 2020-07-10 | Stop reason: HOSPADM

## 2020-07-10 RX ORDER — SODIUM CHLORIDE 9 MG/ML
50 INJECTION, SOLUTION INTRAVENOUS CONTINUOUS
Status: DISCONTINUED | OUTPATIENT
Start: 2020-07-10 | End: 2020-07-10 | Stop reason: HOSPADM

## 2020-07-10 RX ORDER — FENTANYL CITRATE 50 UG/ML
25 INJECTION, SOLUTION INTRAMUSCULAR; INTRAVENOUS AS NEEDED
Status: DISCONTINUED | OUTPATIENT
Start: 2020-07-10 | End: 2020-07-10 | Stop reason: HOSPADM

## 2020-07-10 RX ORDER — DEXTROMETHORPHAN/PSEUDOEPHED 2.5-7.5/.8
1.2 DROPS ORAL
Status: DISCONTINUED | OUTPATIENT
Start: 2020-07-10 | End: 2020-07-10 | Stop reason: HOSPADM

## 2020-07-10 RX ORDER — PROPOFOL 10 MG/ML
INJECTION, EMULSION INTRAVENOUS AS NEEDED
Status: DISCONTINUED | OUTPATIENT
Start: 2020-07-10 | End: 2020-07-10 | Stop reason: HOSPADM

## 2020-07-10 RX ADMIN — PROPOFOL 20 MG: 10 INJECTION, EMULSION INTRAVENOUS at 07:55

## 2020-07-10 RX ADMIN — SODIUM CHLORIDE: 9 INJECTION, SOLUTION INTRAVENOUS at 07:29

## 2020-07-10 RX ADMIN — PROPOFOL 50 MG: 10 INJECTION, EMULSION INTRAVENOUS at 08:02

## 2020-07-10 RX ADMIN — PROPOFOL 50 MG: 10 INJECTION, EMULSION INTRAVENOUS at 07:59

## 2020-07-10 RX ADMIN — PROPOFOL 50 MG: 10 INJECTION, EMULSION INTRAVENOUS at 07:51

## 2020-07-10 RX ADMIN — PROPOFOL 50 MG: 10 INJECTION, EMULSION INTRAVENOUS at 07:53

## 2020-07-10 NOTE — ANESTHESIA PREPROCEDURE EVALUATION
Anesthetic History               Review of Systems / Medical History  Patient summary reviewed, nursing notes reviewed and pertinent labs reviewed    Pulmonary              Pertinent negatives: No recent URI and smoker     Neuro/Psych         Headaches     Cardiovascular  Within defined limits                Exercise tolerance: >4 METS     GI/Hepatic/Renal     GERD (no symptoms currently): well controlled           Endo/Other        Arthritis     Other Findings   Comments: 2 glasses wine daily           Physical Exam    Airway  Mallampati: I  TM Distance: 4 - 6 cm  Neck ROM: normal range of motion   Mouth opening: Normal     Cardiovascular    Rhythm: regular  Rate: normal         Dental  No notable dental hx       Pulmonary  Breath sounds clear to auscultation               Abdominal  GI exam deferred       Other Findings            Anesthetic Plan    ASA: 2  Anesthesia type: MAC          Induction: Intravenous  Anesthetic plan and risks discussed with: Patient

## 2020-07-10 NOTE — H&P
Date: 2020 9:00 AM   Patient Name: Fabrizio Ayala   Account #: [id number]    Gender: Female    (age): 1950 (81)       Provider:     Danny Marie NP        Referring Physician:     Kandace Osullivan MD  [address] [place] [address]  [phone] (phone)  [phone] (fax)        Chief Complaint: acid reflux           History of Present Illness:   She has daily epigastric burning that radiates up into the throat. It bothers her at night and she is sleeping with HOB elevated. She has some upper esophageal dysphagia. Occasional regurgitation. She has been having nausea as well. No vomiting. No weight loss, but appetite a little low. Omeprazole helps. Last year she had hemorrhoidal banding but still has one left that may need surgical resection. SHe sees Dr. Bautista Most next week. She has pain with BMs, but no bleeding. She is having a BM daily when she takes fiber and Miralax. Fiber daily and Miralax every other day. ? She has daily epigastric burning that radiates up into the throat. It bothers her at night and she is sleeping with HOB elevated. She has some upper esophageal dysphagia. Occasional regurgitation. She has been having nausea as well. ? No vomiting. No weight loss, but appetite a little low. Omeprazole helps. Last year she had hemorrhoidal banding but still has one left that may need surgical resection. SHe sees Dr. Bautista Most next week. She has pain with BMs, but no bleeding. She is having a BM daily when she takes fiber and Miralax. Fiber daily and Miralax every other day.              Past Medical History      Medical Conditions: Arthritis  At Risk For Glaucoma  Glaucoma  Hemorrhoids   Surgical Procedures: Tubal Ligation/Sling, 2015  Hand Surgery   Dx Studies: Breast Ultrasound  Colonoscopy,   Colonoscopy, 2017, Internal hemorrhoids  CT Scan, 2017  Pre-Procedure Call, 2017   Medications: minocycline 50 mg TK 1 T PO D FOR ROSACEA  trazodone 50 mg Take 2 tablets by mouth at bedtime   Allergies: Penicillins   Immunizations: Flu vaccine, 2018  Pneumococcal conjugate PCV 13, 2018  zoster, ? age 63      Social History      Alcohol: Alcohol consumption: Daily. Beer 1 drink. Consume Alcohol 1 times a week. Tobacco: Former smokerCigarettes, quit 1981. Drugs: None   Exercise: Cardio 30-60 mins 5 times a week. Exercise 3 or more times a week. Weight Training 20 mins 2 times a week. Caffeine: 3. Coffee or Tea # of cups per day:. Daily. Marital Status:          Occupation:               Family History No history of Esophogeal Cancer, GI Cancers, IBD (Crohn's or UC), Liver disease  Sister: Diagnosed with Family history of colon polyps; Mother: Diagnosed with Family history of colon cancer;       Review of Systems:   Cardiovascular: Denies chest pain, irregular heart beat, palpitations, peripheral edema, syncope, Sweats. Constitutional: Denies fatigue, fever, loss of appetite, weight gain, weight loss. ENMT: Denies nose bleeds, sore throat, hearing loss. Endocrine: Denies excessive thirst, heat intolerance. Eyes: Denies loss of vision. Gastrointestinal: Presents suffers from abdominal pain, constipation, Bloating/gas, heartburn, nausea, dysphagia. Denies abdominal swelling, change in bowel habits, diarrhea, jaundice, rectal bleeding, stomach cramps, vomiting, rectal pain, Stool incontinence, hematemesis. Genitourinary: Denies dark urine, dysuria, frequent urination, hematuria, incontinence. Hematologic/Lymphatic: Denies easy bruising, prolonged bleeding. Integumentary: Denies itching, rashes, sun sensitivity. Musculoskeletal: Presents suffers from arthritis, back pain, joint pain. Denies gout, muscle weakness, stiffness. Neurological: Denies dizziness, fainting, frequent headaches, memory loss. Psychiatric: Denies anxiety, depression, difficulty sleeping, hallucinations, nervousness, panic attacks, paranoia.    Respiratory: Denies cough, dyspnea, wheezing. Vital Signs:   BP  (mmHg)  Pulse  (ppm) Weight (lbs/oz) Height (ft/in) BMI Temp   120/76 64 166 / 4 5 / 8 25.28 98.1 (F)      Physical Exam:            Lab Results:      Test 1/29/2018 Units Limits   Comp. Metabolic Panel (14)      A/G Ratio 2.2  1.2 - 2.2   Albumin, Serum 4.9 g/dL 3.6 - 4.8   Alkaline Phosphatase, S 87 IU/L 39 - 117   ALT (SGPT) 21 IU/L 0 - 32   AST (SGOT) 28 IU/L 0 - 40   Bilirubin, Total 0.3 mg/dL 0 - 1.2   BUN 15 mg/dL 8 - 27   BUN/Creatinine Ratio 18  12 - 28   Calcium, Serum 10.2 mg/dL 8.7 - 10.3   Carbon Dioxide, Total 25 mmol/L 18 - 29   Chloride, Serum 97 mmol/L 96 - 106   Creatinine, Serum 0.82 mg/dL 0.57 - 1   eGFR If Africn Am 87 mL/min/1.73 > 59   eGFR If NonAfricn Am 75 mL/min/1.73 > 59   Globulin, Total 2.2 g/dL 1.5 - 4.5   Glucose, Serum 88 mg/dL 65 - 99   Potassium, Serum 3.9 mmol/L 3.5 - 5.2   Protein, Total, Serum 7.1 g/dL 6 - 8.5   Sodium, Serum 141 mmol/L 134 - 144     Impressions: Dysphagia  Gastro-esophageal reflux disease with esophagitis  Nausea  External hemorrhoids? ? Dysphagia? ?  ? ? Gastro-esophageal reflux disease with esophagitis? ?  ? ?Nausea? ?  ? ? External hemorrhoids? Assessment: Ms. Neftaly Lam returns with worsening reflux symptoms and some upper esophageal dysphagia of solids. She has had esophagitis and acquired ring on last EGD requiring dilation. She is improving by using PPI daily which she should continue. I will set her up for repeat EGD and dilation. If no peptic cause for symptoms, then consider US or CT. We will see in close follow up. SHe is seeing Dr. Tomer Atkinson for her hemorrhoids. ? Ms. Neftaly Lam returns with worsening reflux symptoms and some upper esophageal dysphagia of solids. She has had esophagitis and acquired ring on last EGD requiring dilation. She is improving by using PPI daily which she should continue. I will set her up for repeat EGD and dilation. If no peptic cause for symptoms, then consider US or CT.  We will see in close follow up. SHe is seeing Dr. Mery Thurman for her hemorrhoids. Plan: omeprazole 20 mg Take 1 capsule by mouth once a day before breakfast for 60 days  EGD with dilation  Education on Endoscopy  Upper Endoscopy: Esophagastroduodenoscopy and its purposes explained to the patient point by point in detail. We went over risks and complications. These include but are not limited to aspiration of secretions, perforation of the intestinal track requiring surgery, infection, bleeding, reaction to medications, risks of sedation and anesthesia, and unforeseen problems. The patient understands and accepts the risks and would like to proceed. Follow-up office visit in 1 month? ?omeprazole? ?20 mg? ? Take 1 capsule by mouth once a day before breakfast for 60 days? ? ?  ? ?EGD with dilation? ?  ? ? Education on Endoscopy? ?  ? Upper Endoscopy: ?Esophagastroduodenoscopy and its purposes explained to the patient point by point in detail. We went over risks and complications. These include but are not limited to aspiration of secretions, perforation of the intestinal track requiring surgery, infection, bleeding, reaction to medications, risks of sedation and anesthesia, and unforeseen problems. The patient understands and accepts the risks and would like to proceed. ? ?  ? ? ? Follow-up office visit in 1 month? ? Risk & Medical Necessity: The patient requires Moderate to High Severity care for this visit. Diagnosis and management options are Multiple. The amount of data reviewed and/or ordered is Moderate. The level of risk is Moderate. Notes: Dr. Silvana Armas was available for consultation. Talat Young NP     Electronically signed on 2020 9:27:54 AM by REINIER Chairez 1.  Elizabeth Richard, MRN [id number],  1950 Follow Up, 2020 New     Modify          Delete     Delete all     Edit Wording          Sign     [email or web address]

## 2020-07-10 NOTE — ANESTHESIA POSTPROCEDURE EVALUATION
Procedure(s):  EGD  ESOPHAGOGASTRODUODENAL (EGD) BIOPSY  ESOPHAGEAL DILATION. MAC    Anesthesia Post Evaluation      Multimodal analgesia: multimodal analgesia used between 6 hours prior to anesthesia start to PACU discharge  Patient location during evaluation: bedside  Patient participation: complete - patient participated  Level of consciousness: awake  Pain management: adequate  Airway patency: patent  Anesthetic complications: no  Cardiovascular status: acceptable  Respiratory status: acceptable  Hydration status: acceptable        INITIAL Post-op Vital signs:   Vitals Value Taken Time   /79 7/10/2020  8:21 AM   Temp 36.4 °C (97.6 °F) 7/10/2020  8:06 AM   Pulse 60 7/10/2020  8:26 AM   Resp 14 7/10/2020  8:26 AM   SpO2 100 % 7/10/2020  8:26 AM   Vitals shown include unvalidated device data.

## 2020-07-10 NOTE — DISCHARGE INSTRUCTIONS
[address] [facility] PALMER Solomon MD  [phone]      July 10, 2020     Joel Swan  YOB: 1950    ENDOSCOPY DISCHARGE INSTRUCTIONS    If there is redness at IV site you should apply warm compress to area. If redness or soreness persist contact Dr. Chelsea Solomon' or your primary care doctor. Gaseous discomfort may develop, but walking, belching will help relieve this. You may not operate a vehicle for 12 hours  You may not operate machinery or dangerous appliances for rest of today  You may not drink alcoholic beverages for 12 hours  Avoid making any critical decisions for 24 hours    DIET:  You may resume your normal diet, but some patients find that heavy or large meals may lead to indigestion or vomiting. I suggest a light meal as first food intake. MEDICATIONS:  The use of some over-the-counter pain medication may lead to bleeding after biopsies or other procedures you may have had done. Tylenol (also called acetaminophen) is safe to take and will not lead to bleeding. Based on the procedure you had today you may not safely take aspirin or aspirin-like products for the next ten (10) days. ACTIVITY:  You may resume your normal household activities, but it is recommended that you spend the remainder of the day resting -  avoid any strenuous activity. CALL DR. Allen Albarado' OFFICE IF:  Increasing pain, nausea, vomiting  Abdominal distension (swelling)  Significant new or increased bleeding (oral or rectal)  Fever/Chills  Chest pain/shortness of breath                   Additional instructions:   No aspirin 10 days. We found narrowing of the esophagus which we biopsied and dilated/stretched today.   I want you to keep taking your acid reflux medication (omeprazole) once daily 30-60 min before breakfast.  I will arrange a follow up visit in 8 weeks so we can discuss whether to continue that medication at that time.     It was an honor to be your doctor today. Please let me or my office staff know if you have any feedback about today's procedure.     Danielle Morrison MD

## 2020-07-10 NOTE — INTERVAL H&P NOTE
Pre-Endoscopy H&P Update  Chief complaint/HPI/ROS:  The indication for the procedure, the patient's history and the patient's current medications are reviewed prior to the procedure and that data is reported on the H&P to which this document is attached. Any significant complaints with regard to organ systems will be noted, and if not mentioned then a review of systems is not contributory. Past Medical History:   Diagnosis Date    Adverse effect of anesthesia     bradycardia:  stress test negative    Arthritis     GERD (gastroesophageal reflux disease)     diet control; medication only when necessary      Past Surgical History:   Procedure Laterality Date    ABDOMEN SURGERY PROC UNLISTED      bladder sling per pt    COLONOSCOPY N/A 10/17/2018    COLONOSCOPY performed by Tamia Gil MD at [address] [place] [address] Left 2017    LEFT BREAST NEEDLE LOCALIZATION BIOPSY (8:30 NL) performed by Ashley Ramirez MD at [address] [place] [address] HX COLONOSCOPY      HX HEENT      wisdom teeth    HX ORTHOPAEDIC Right 2016    hand \"tendon rolled up and put between the bones\" per pt    HX ORTHOPAEDIC Left 2020    same as above    HX ORTHOPAEDIC Left 2019    knee arthroscopy    HX TONSILLECTOMY      HX TUBAL LIGATION       Social   Social History     Tobacco Use    Smoking status: Former Smoker     Packs/day: 1.50     Years: 10.00     Pack years: 15.00     Last attempt to quit: Amanda Crawford     Years since quittin.5    Smokeless tobacco: Never Used   Substance Use Topics    Alcohol use: Yes     Alcohol/week: 12.0 standard drinks     Types: 12 Glasses of wine per week     Comment: 1-2 drinks per day      Family History   Problem Relation Age of Onset    Cancer Mother         breast, colon    Stroke Father       Allergies   Allergen Reactions    Penicillins Unknown (comments)     Told all of her life she was allergic.   Unknown if taken Keflex      Prior to Admission Medications   Prescriptions Last Dose Informant Patient Reported? Taking? cholecalciferol, vitamin D3, (D3 DOTS) 50 mcg (2,000 unit) tab 7/9/2020 at Unknown time  Yes Yes   Sig: Take 1 Tab by mouth daily. digestive enzymes cap 7/9/2020 at Unknown time  Yes Yes   Sig: Take 1 Cap by mouth two (2) times a day. magnesium oxide (MAG-OX) 400 mg tablet 7/9/2020 at Unknown time  Yes Yes   Sig: Take 400 mg by mouth daily. omega 3-dha-epa-fish oil (FISH OIL) 100-160-1,000 mg cap Not Taking at Unknown time  Yes No   Sig: Take  by mouth. omeprazole (PRILOSEC) 20 mg capsule 7/9/2020 at Unknown time  Yes Yes   Sig: Take 20 mg by mouth daily. Indications: gastroesophageal reflux disease   traZODone (DESYREL) 50 mg tablet 7/9/2020 at Unknown time Self Yes Yes   Sig: Take 50 mg by mouth nightly. Facility-Administered Medications: None       PHYSICAL EXAM:  The patient is examined immediately prior to the procedure. Visit Vitals  /59   Pulse 64   Temp 98.5 °F (36.9 °C)   Resp 8   Ht 5' 7.5\" (1.715 m)   Wt 75 kg (165 lb 5.5 oz)   SpO2 100%   Breastfeeding No   BMI 25.51 kg/m²     Gen: Appears comfortable, no distress. Pulm: comfortable respirations with no abnormal audible breath sounds  HEART: well perfused, no abnormal audible heart sounds  GI: abdomen flat. PLAN:  Informed consent discussion held, patient afforded an opportunity to ask questions and all questions answered. After being advised of the risks, benefits, and alternatives, the patient requested that we proceed and indicated so on a written consent form. Will proceed with procedure as planned.   Maynor Nieto MD  '

## 2020-07-10 NOTE — PERIOP NOTES
6221  Anesthesia staff at patient's bedside administering anesthesia and monitoring patients vital signs throughout procedure. See anesthesia note.    Kaitlynn Nelson was pre-cleaned at bedside immediately following procedure by Yessica Johnson, [address]  Patient tolerated procedure without problems. Abdomen soft and patient arousable and voices no complaints Report received from CRNA, see anesthesia note. Patient transported to endoscopy recovery area. Report given to Denise Hanna RN.

## 2020-07-10 NOTE — PROCEDURES
[address]  [phone]      July 10, 2020    Esophagogastroduodenoscopy (EGD) Procedure Note  Johnny Sanderson  : 1950  [employer] Medical Record Number: [id number]      Indications:    Dysphagia/odynophagia, GERD  Referring Physician:  Elian Atkinson MD  Anesthesia/Sedation:  Conscious sedation/deep sedation/monitored anesthesia -- see notes. Endoscopist:  Dr. Marybeth Magana  Complications:  None  Estimated Blood Loss:  None    Permit:  The indications, risks, benefits and alternatives were reviewed with the patient or their decision maker who was provided an opportunity to ask questions and all questions were answered. The specific risks of esophagogastroduodenoscopy with conscious sedation were reviewed, including but not limited to anesthetic complication, bleeding, adverse drug reaction, missed lesion, infection, IV site reactions, and intestinal perforation which would lead to the need for surgical repair. Alternatives to EGD including radiographic imaging, observation without testing, or laboratory testing were reviewed as well as the limitations of those alternatives discussed. After considering the options and having all their questions answered, the patient or their decision maker provided both verbal and written consent to proceed. Procedure in Detail:  After obtaining informed consent, positioning of the patient in the left lateral decubitus position, and conduction of a pre-procedure pause or \"time out\" the endoscope was introduced into the mouth and advanced to the duodenum. A careful inspection was made, and findings or interventions are described below. Findings:   Esophagus:4 cm hiatal hernia, LA class A erosive esophagitis atop an acquired esophageal ring. The ring is biopsied then dilated with 20mm balloon.   The balloon device failed in a manner that did not expose the patient to any risk- it was leaking fluid and would not hold pressure to allow good esophageal dilation. As a result we used a 20mm savary passed over a guidewire for esophageal dilation. Stomach: normal   Duodenum/jejunum: normal      Specimens: See above    Impression: Hiatal hernia, mild esophagitis, and esophageal ring. Recommendations:  -Acid suppression with a proton pump inhibitor. , -Await pathology. Thank you for entrusting me with this patient's care. Please do not hesitate to contact me with any questions or if I can be of assistance with any of your other patients' GI needs. Signed By: George Yu MD                        July 10, 2020     Surgical assistant none. Implants none unless specified.

## 2020-07-10 NOTE — PERIOP NOTES
CRE balloon dilatation of the esophagus   18 mm Balloon inflated to 3 ATMs and held for 2 seconds. 19 mm Balloon inflated to 4.5 ATMs and held for 2 seconds. 20 mm Balloon inflated to 6 ATMs and held for 5 seconds. No subcutaneous crepitus of the chest or cervical region was noted post dilatation. Syringe could not hold pressure, another syringe used. Then [place] #20 used.

## 2020-07-10 NOTE — ROUTINE PROCESS
Carlos Eduardo Olmos  1950  [id number]    Situation:  Verbal report received from: Ofelia Peres RN  Procedure: Procedure(s):  EGD  ESOPHAGOGASTRODUODENAL (EGD) BIOPSY  ESOPHAGEAL DILATION    Background:    Preoperative diagnosis: GASTRO-ESOPHAGEAL REFLUX DISEASE WITH ESOPHAGITIS, DYSPHAGIA  Postoperative diagnosis: hiatal hernia, esophagitis, esophageal ring. :  Dr. Jeff Lawler  Assistant(s): Endoscopy Technician-1: Jessica Saxena  Endoscopy RN-1: Demetris Chaudhary RN    Specimens:   ID Type Source Tests Collected by Time Destination   1 : ge junction biopsy Preservative   Nanda Currie MD 7/10/2020 7934 Pathology     H. Pylori  no    Assessment:  Intra-procedure medications     Anesthesia gave intra-procedure sedation and medications, see anesthesia flow sheet yes    Intravenous fluids: NS@ KVO     Vital signs stable     Abdominal assessment: round and soft   No crepitus felt around collarbones    Recommendation:  Discharge patient per MD order. Family or Friend   Permission to share finding with family or friend yes    Endoscopy discharge instructions have been reviewed and given to patient and spouse. The patient and spouse verbalized understanding and acceptance of instructions. Dr. Jeff Lawler discussed with patient and spouse procedure findings and next steps.

## 2021-04-09 ENCOUNTER — TRANSCRIBE ORDER (OUTPATIENT)
Dept: REGISTRATION | Age: 71
End: 2021-04-09

## 2021-04-09 ENCOUNTER — HOSPITAL ENCOUNTER (OUTPATIENT)
Dept: LAB | Age: 71
Discharge: HOME | End: 2021-04-09
Payer: MEDICARE

## 2021-04-09 DIAGNOSIS — U07.1 COVID-19: ICD-10-CM

## 2021-04-09 DIAGNOSIS — U07.1 COVID-19: Primary | ICD-10-CM

## 2021-04-09 PROCEDURE — U0003 INFECTIOUS AGENT DETECTION BY NUCLEIC ACID (DNA OR RNA); SEVERE ACUTE RESPIRATORY SYNDROME CORONAVIRUS 2 (SARS-COV-2) (CORONAVIRUS DISEASE [COVID-19]), AMPLIFIED PROBE TECHNIQUE, MAKING USE OF HIGH THROUGHPUT TECHNOLOGIES AS DESCRIBED BY CMS-2020-01-R: HCPCS

## 2021-04-10 LAB — SARS-COV-2, COV2NT: NOT DETECTED

## 2021-04-12 RX ORDER — BISMUTH SUBSALICYLATE 262 MG
1 TABLET,CHEWABLE ORAL DAILY
COMMUNITY

## 2021-04-12 NOTE — PERIOP NOTES
[address]  Endoscopy Preprocedure Instructions      1. On the day of your surgery, please report to registration located on the 2nd floor of the  [facility] ED. yes    2. You must have a responsible adult to drive you to the hospital, stay at the hospital during your procedure and drive you home. If they leave your procedure will not be started (no exceptions). yes    3. Do not have anything to eat or drink (including water, gum, mints, coffee, and juice) after midnight. This does not apply to the medications you were instructed to take by your physician. yesIf you are currently taking Plavix, Coumadin, Aspirin, or other blood-thinning agents, contact your physician for special instructions. yes,    4. If you are having a procedure that requires bowel prep: We recommend that you have only clear liquids the day before your procedure and begin your bowel prep by 5:00 pm.  You may continue to drink clear liquids until midnight. If for any reason you are not able to complete your prep please notify your physician immediately. yes    5. Have a list of all current medications, including vitamins, herbal supplements and any other over the counter medications. yes    6. If you wear glasses, contacts, dentures and/or hearing aids, they may be removed prior to procedure, please bring a case to store them in. yes    7. You should understand that if you do not follow these instructions your procedure may be cancelled. If your physical condition changes (I.e. fever, cold or flu) please contact your doctor as soon as possible. 8. It is important that you be on time.   If for any reason you are unable to keep your appointment please call )- the day of or your physicians office prior to your scheduled procedure

## 2021-04-13 ENCOUNTER — ANESTHESIA EVENT (OUTPATIENT)
Dept: ENDOSCOPY | Age: 71
End: 2021-04-13
Payer: MEDICARE

## 2021-04-13 ENCOUNTER — ANESTHESIA (OUTPATIENT)
Dept: ENDOSCOPY | Age: 71
End: 2021-04-13
Payer: MEDICARE

## 2021-04-13 ENCOUNTER — HOSPITAL ENCOUNTER (OUTPATIENT)
Age: 71
Setting detail: OUTPATIENT SURGERY
Discharge: HOME | End: 2021-04-13
Attending: SPECIALIST | Admitting: SPECIALIST
Payer: MEDICARE

## 2021-04-13 VITALS
HEART RATE: 66 BPM | TEMPERATURE: 97.7 F | BODY MASS INDEX: 25.06 KG/M2 | HEIGHT: 68 IN | DIASTOLIC BLOOD PRESSURE: 78 MMHG | SYSTOLIC BLOOD PRESSURE: 130 MMHG | WEIGHT: 165.34 LBS | OXYGEN SATURATION: 99 % | RESPIRATION RATE: 10 BRPM

## 2021-04-13 PROCEDURE — 2709999900 HC NON-CHARGEABLE SUPPLY: Performed by: SPECIALIST

## 2021-04-13 PROCEDURE — 74011250636 HC RX REV CODE- 250/636: Performed by: NURSE ANESTHETIST, CERTIFIED REGISTERED

## 2021-04-13 PROCEDURE — 76040000019: Performed by: SPECIALIST

## 2021-04-13 PROCEDURE — 77030021593 HC FCPS BIOP ENDOSC BSC -A: Performed by: SPECIALIST

## 2021-04-13 PROCEDURE — 88305 TISSUE EXAM BY PATHOLOGIST: CPT

## 2021-04-13 PROCEDURE — 77030013992 HC SNR POLYP ENDOSC BSC -B: Performed by: SPECIALIST

## 2021-04-13 PROCEDURE — 76060000031 HC ANESTHESIA FIRST 0.5 HR: Performed by: SPECIALIST

## 2021-04-13 RX ORDER — MIDAZOLAM HYDROCHLORIDE 1 MG/ML
.25-5 INJECTION, SOLUTION INTRAMUSCULAR; INTRAVENOUS AS NEEDED
Status: DISCONTINUED | OUTPATIENT
Start: 2021-04-13 | End: 2021-04-13 | Stop reason: HOSPADM

## 2021-04-13 RX ORDER — DEXTROMETHORPHAN/PSEUDOEPHED 2.5-7.5/.8
1.2 DROPS ORAL
Status: DISCONTINUED | OUTPATIENT
Start: 2021-04-13 | End: 2021-04-13 | Stop reason: HOSPADM

## 2021-04-13 RX ORDER — FLUMAZENIL 0.1 MG/ML
0.2 INJECTION INTRAVENOUS
Status: DISCONTINUED | OUTPATIENT
Start: 2021-04-13 | End: 2021-04-13 | Stop reason: HOSPADM

## 2021-04-13 RX ORDER — FENTANYL CITRATE 50 UG/ML
25 INJECTION, SOLUTION INTRAMUSCULAR; INTRAVENOUS AS NEEDED
Status: DISCONTINUED | OUTPATIENT
Start: 2021-04-13 | End: 2021-04-13 | Stop reason: HOSPADM

## 2021-04-13 RX ORDER — SODIUM CHLORIDE 9 MG/ML
INJECTION, SOLUTION INTRAVENOUS
Status: DISCONTINUED | OUTPATIENT
Start: 2021-04-13 | End: 2021-04-13 | Stop reason: HOSPADM

## 2021-04-13 RX ORDER — SODIUM CHLORIDE 9 MG/ML
50 INJECTION, SOLUTION INTRAVENOUS CONTINUOUS
Status: DISCONTINUED | OUTPATIENT
Start: 2021-04-13 | End: 2021-04-13 | Stop reason: HOSPADM

## 2021-04-13 RX ORDER — NALOXONE HYDROCHLORIDE 0.4 MG/ML
0.4 INJECTION, SOLUTION INTRAMUSCULAR; INTRAVENOUS; SUBCUTANEOUS
Status: DISCONTINUED | OUTPATIENT
Start: 2021-04-13 | End: 2021-04-13 | Stop reason: HOSPADM

## 2021-04-13 RX ORDER — PROPOFOL 10 MG/ML
INJECTION, EMULSION INTRAVENOUS
Status: DISCONTINUED | OUTPATIENT
Start: 2021-04-13 | End: 2021-04-13 | Stop reason: HOSPADM

## 2021-04-13 RX ADMIN — PROPOFOL INJECTABLE EMULSION 500 MCG/KG/MIN: 10 INJECTION, EMULSION INTRAVENOUS at 08:56

## 2021-04-13 RX ADMIN — SODIUM CHLORIDE: 9 INJECTION, SOLUTION INTRAVENOUS at 08:56

## 2021-04-13 NOTE — ANESTHESIA PREPROCEDURE EVALUATION
Anesthetic History   No history of anesthetic complications            Review of Systems / Medical History  Patient summary reviewed, nursing notes reviewed and pertinent labs reviewed    Pulmonary  Within defined limits                 Neuro/Psych         Headaches     Cardiovascular  Within defined limits                Exercise tolerance: >4 METS     GI/Hepatic/Renal     GERD (no symptoms currently): well controlled           Endo/Other        Arthritis     Other Findings              Physical Exam    Airway  Mallampati: I  TM Distance: 4 - 6 cm  Neck ROM: normal range of motion   Mouth opening: Normal     Cardiovascular    Rhythm: regular  Rate: normal         Dental  No notable dental hx       Pulmonary  Breath sounds clear to auscultation               Abdominal  GI exam deferred       Other Findings            Anesthetic Plan    ASA: 2  Anesthesia type: MAC          Induction: Intravenous  Anesthetic plan and risks discussed with: Patient

## 2021-04-13 NOTE — H&P
Telemedicine Visit     --------------------------------------------------------------------------------  Patient Name: Feliberto Ayala   Account #: [id number]    Gender: Female    (age): 1950 (78)       Provider:     Alis Jacinto. Jeanmarie Renner MD      Staff:     Rakesh Gil MA      Date: 3/18/2021 1:45 PM     Communication Technology Used: Audio and Visual Used  ? Audio and Visual Used      Referring Physician:     Dieter Reed MD  [address] [place] [address]  [phone] (phone)  [phone] (fax)        Chief Complaint: Change in bowel habits, rectal pain. History of Present Illness:   60-year-old female last seen in 2020 complains of a recent change in her bowel habits. She describes a sense of incomplete evacuation, having to move her bowels several times during the day in association with rectal pain. She describes a continuous discomfort in the rectum described as a pressure sensation which is worse with bowel movements. She denies blood in stool. She is worried that the symptoms might represent something serious. Recall she had colonoscopy approximately 3 years ago revealing only hemorrhoids. Given the change in her bowel habits and her rectal pain I have recommended colonoscopy sooner than planned. She was happy to proceed. ? ? 60-year-old female last seen in 2020 complains of a recent change in her bowel habits. She describes a sense of incomplete evacuation, having to move her bowels several times during the day in association with rectal pain. She describes a continuous discomfort in the rectum described as a pressure sensation which is worse with bowel movements. She denies blood in stool. She is worried that the symptoms might represent something serious. Recall she had colonoscopy approximately 3 years ago revealing only hemorrhoids. Given the change in her bowel habits and her rectal pain I have recommended colonoscopy sooner than planned. She was happy to proceed. Past Medical History      Medical Conditions: Arthritis  Glaucoma  Hemorrhoids   Surgical Procedures: Tubal Ligation/Sling, 9/2015  Hand Surgery   Dx Studies: Breast Ultrasound  Colonoscopy, 2012  Colonoscopy, 2/9/2017, Internal hemorrhoids  CT Scan, 11/2017  Esophagogastroduodenoscopy, 7/10/2020  Pre-Procedure Call, 2/1/2017   Medications: minocycline 50 mg Take 1 tablet by mouth once a day  omeprazole 20 mg TAKE 1 CAPSULE BY MOUTH EVERY DAY  trazodone 50 mg Take 2 tablets by mouth at bedtime   Allergies: Penicillins   Immunizations: Influenza, seasonal, injectable, 10/01/2019  Flu vaccine, 2018  Pneumococcal conjugate PCV 13, 2018  zoster, ? age 63      Social History      Alcohol: Alcohol consumption: Daily. Beer 1 drink. Consume Alcohol 1 times a week. Tobacco: Former smokerCigarettes, quit 1981. Drugs: None   Exercise: Cardio 30-60 mins 5 times a week. Exercise 3 or more times a week. Weight Training 20 mins 2 times a week. Caffeine: 3. Coffee or Tea # of cups per day:. Daily. Marital Status:          Occupation:               Family History No history of Esophogeal Cancer, GI Cancers, IBD (Crohn's or UC), Liver disease  Sister: Diagnosed with Family history of colon polyps; Mother: Diagnosed with Family history of colon cancer;       Review of Systems:   Cardiovascular: Denies chest pain, irregular heart beat, palpitations, peripheral edema, syncope, Sweats. Constitutional: Denies fatigue, fever, loss of appetite, weight gain, weight loss. ENMT: Denies nose bleeds, sore throat, hearing loss. Endocrine: Denies excessive thirst, heat intolerance. Eyes: Denies loss of vision. Gastrointestinal: Presents suffers from change in bowel habits, constipation, rectal pain. Denies abdominal pain, abdominal swelling, diarrhea, Bloating/gas, heartburn, jaundice, nausea, rectal bleeding, stomach cramps, vomiting, dysphagia, Stool incontinence, hematemesis.    Genitourinary: Denies dark urine, dysuria, frequent urination, hematuria, incontinence. Hematologic/Lymphatic: Denies easy bruising, prolonged bleeding. Integumentary: Denies itching, rashes, sun sensitivity. Musculoskeletal: Denies arthritis, back pain, gout, joint pain, muscle weakness, stiffness. Neurological: Denies dizziness, fainting, frequent headaches, memory loss. Psychiatric: Denies anxiety, depression, difficulty sleeping, hallucinations, nervousness, panic attacks, paranoia. Respiratory: Denies cough, dyspnea, wheezing. Vital Signs: reported by patient   Weight (lbs/oz) Height (ft/in) BMI   166 /  5 / 8 25.24      Physical Exam:   Constitutional:      Appearance: No distress, appears comfortable. Communication: Understands/receives spoken information. Skin:      Inspection: No rash, no jaundice. Lab Results:      Test 1/29/2018 Units Limits   Comp. Metabolic Panel (14)      A/G Ratio 2.2  1.2 - 2.2   Albumin, Serum 4.9 g/dL 3.6 - 4.8   Alkaline Phosphatase, S 87 IU/L 39 - 117   ALT (SGPT) 21 IU/L 0 - 32   AST (SGOT) 28 IU/L 0 - 40   Bilirubin, Total 0.3 mg/dL 0 - 1.2   BUN 15 mg/dL 8 - 27   BUN/Creatinine Ratio 18  12 - 28   Calcium, Serum 10.2 mg/dL 8.7 - 10.3   Carbon Dioxide, Total 25 mmol/L 18 - 29   Chloride, Serum 97 mmol/L 96 - 106   Creatinine, Serum 0.82 mg/dL 0.57 - 1   eGFR If Africn Am 87 mL/min/1.73 > 59   eGFR If NonAfricn Am 75 mL/min/1.73 > 59   Globulin, Total 2.2 g/dL 1.5 - 4.5   Glucose, Serum 88 mg/dL 65 - 99   Potassium, Serum 3.9 mmol/L 3.5 - 5.2   Protein, Total, Serum 7.1 g/dL 6 - 8.5   Sodium, Serum 141 mmol/L 134 - 144     Impressions: Change in bowel habit  Rectal pain? ?Change in bowel habit? ?  ? ? Rectal pain? Assessment: ?         Plan: Colonoscopy? ? Colonoscopy? Risk & Medical Necessity: The level of medical decision making for this visit is low. The number and complexity of problems addressed are moderate.  The risk of complications and/or morbidity or mortality of patient management is low. Duration of Video and/or Phone Call:   [phone]      Notes:              Gabby Lawton. Asad Franklin MD     Electronically signed on 3/18/2021 2:01:06 PM by Gabby Lawton. Asad Franklin, [address] [facility]  Bradley Mcgregor, MRN [id number],  1950 Telemedicine Follow Up,                                                                                                                                                         New     Modify          Delete     Delete all     Edit Wording          Sign     [email or web address]

## 2021-04-13 NOTE — INTERVAL H&P NOTE
Pre-Endoscopy H&P Update Chief complaint/HPI/ROS:  The indication for the procedure, the patient's history and the patient's current medications are reviewed prior to the procedure and that data is reported on the H&P to which this document is attached. Any significant complaints with regard to organ systems will be noted, and if not mentioned then a review of systems is not contributory. Past Medical History:  
Diagnosis Date Adverse effect of anesthesia   
 bradycardia:  stress test negative Arthritis GERD (gastroesophageal reflux disease) diet control; medication only when necessary Past Surgical History:  
Procedure Laterality Date COLONOSCOPY N/A 10/17/2018 [facility] LEFT BREAST NEEDLE LOCALIZATION BIOPSY (8:30 NL) performed by Teresa Leos MD at [facility] [address]  
 HX COLONOSCOPY   HX HEENT    
 wisdom teeth HX ORTHOPAEDIC Right 2016  
 hand \"tendon rolled up and put between the bones\" per pt HX ORTHOPAEDIC Left 2020  
 same as above HX ORTHOPAEDIC Left 2019  
 knee arthroscopy HX TONSILLECTOMY HX TUBAL LIGATION    
 MO ABDOMEN SURGERY PROC UNLISTED    
 bladder sling per pt Social  
Social History Tobacco Use Smoking status: Former Smoker Packs/day: 1.50 Years: 10.00 Pack years: 15.00 Quit date: 18 Years since quittin.3 Smokeless tobacco: Never Used Substance Use Topics Alcohol use: Yes Alcohol/week: 14.0 standard drinks Types: 14 Glasses of wine per week Comment: 1-2 drinks per day Family History Problem Relation Age of Onset Cancer Mother   
     breast, colon Stroke Father Allergies Allergen Reactions Penicillins Unknown (comments) Told all of her life she was allergic. Unknown if taken Keflex Prior to Admission Medications Prescriptions Last Dose Informant Patient Reported? Taking? cholecalciferol, vitamin D3, (D3 DOTS) 50 mcg (2,000 unit) tab 4/11/2021  Yes Yes Sig: Take 1 Tab by mouth daily. digestive enzymes cap 4/11/2021  Yes Yes Sig: Take 1 Cap by mouth two (2) times a day. multivitamin (ONE A DAY) tablet 4/11/2021  Yes Yes Sig: Take 1 Tab by mouth daily. omega 3-dha-epa-fish oil (FISH OIL) 100-160-1,000 mg cap 4/11/2021  Yes Yes Sig: Take  by mouth. omeprazole (PRILOSEC) 20 mg capsule 4/12/2021  Yes Yes Sig: Take 20 mg by mouth daily. Indications: gastroesophageal reflux disease  
traZODone (DESYREL) 50 mg tablet 4/12/2021 Self Yes Yes Sig: Take 50 mg by mouth nightly. Facility-Administered Medications: None PHYSICAL EXAM:  The patient is examined immediately prior to the procedure. Visit Vitals /80 Pulse 65 Temp 98 °F (36.7 °C) Resp 10 Ht 5' 8\" (1.727 m) Wt 75 kg (165 lb 5.5 oz) SpO2 100% Breastfeeding No  
BMI 25.14 kg/m² Gen: Appears comfortable, no distress. Pulm: comfortable respirations with no abnormal audible breath sounds HEART: well perfused, no abnormal audible heart sounds GI: abdomen flat. PLAN:  Informed consent discussion held, patient afforded an opportunity to ask questions and all questions answered. After being advised of the risks, benefits, and alternatives, the patient requested that we proceed and indicated so on a written consent form. Will proceed with procedure as planned.  
Francie Perez MD

## 2021-04-13 NOTE — PROCEDURES
[address] [facility] PALMER Magallanes MD  [phone]      2021    Colonoscopy Procedure Note  Radha Ann  :  1950  [place] Medical Record Number: [id number]    Indications:     Personal history of colon polyps (screening only), rectal pain, family history of colon cancer. PCP:  Tony, MD [place]  Anesthesia/Sedation: Conscious Sedation/Moderate Sedation/GETA, see notes  Endoscopist:  Dr. Leslee Brunson  Complications:  None  Estimated Blood Loss:  None    Permit:  The indications, risks, benefits and alternatives were reviewed with the patient or their decision maker who was provided an opportunity to ask questions and all questions were answered. The specific risks of colonoscopy with conscious sedation were reviewed, including but not limited to anesthetic complication, bleeding, adverse drug reaction, missed lesion, infection, IV site reactions, and intestinal perforation which would lead to the need for surgical repair. Alternatives to colonoscopy including radiographic imaging, observation without testing, or laboratory testing were reviewed including the limitations of those alternatives. After considering the options and having all their questions answered, the patient or their decision maker provided both verbal and written consent to proceed. Procedure in Detail:  After obtaining informed consent, positioning of the patient in the left lateral decubitus position, and conduction of a pre-procedure pause or \"time out\" the endoscope was introduced into the anus and advanced to the cecum, which was identified by the ileocecal valve and appendiceal orifice. The quality of the colonic preparation was good. A careful inspection was made as the colonoscope was withdrawn, findings and interventions are described below. Findings:    In the rectum, medium internal hemorrhoids are noted without bleeding. No fissure or evidence of perianal inflammation/abscess. Diminutive 2mm polyp in the transverse colon removed with cold forceps. Hemostasis confirmed. There is diverticulosis in the sigmoid colon without complications such as bleeding, inflammatory change, or luminal narrowing. Specimens:    See above    Complications:   None; patient tolerated the procedure well. Impression:  Transverse polyp, diverticulosis. Recommendations:     - Await pathology. Thank you for entrusting me with this patient's care. Please do not hesitate to contact me with any questions or if I can be of assistance with any of your other patients' GI needs. Signed By: Glorianne Landau, MD                        April 13, 2021      Surgical assistant none. Implants none unless specified.

## 2021-04-13 NOTE — ROUTINE PROCESS
Author Garrick 1950 
[id number] Situation: 
Verbal report received from: Anastasiya Demetrius 
Procedure: Procedure(s): 
COLONOSCOPY 
ENDOSCOPIC POLYPECTOMY Background: 
 
Preoperative diagnosis: EXTERNAL HEMORRHOIDS CHANGE IN BOWEL HABIT 
RECTAL PAIN Postoperative diagnosis: Diverticulosis Transverse colon polyp Hemorrhoids :  Dr. Giovanna Quintero Assistant(s): Endoscopy Technician-1: Cesar Zaragoza Endoscopy RN-1: Tanna Leon RN Specimens:  
ID Type Source Tests Collected by Time Destination 1 : polyp Preservative Colon, Transverse  Lili Byrnes MD 4/13/2021 7872 Pathology H. Pylori  no Assessment: 
Intra-procedure medications Anesthesia gave intra-procedure sedation and medications, see anesthesia flow sheet yes Intravenous fluids: NS@ [facility] Vital signs stable Abdominal assessment: round and soft Recommendation: 
Discharge patient per MD order. Family  Permission to share finding with family or friend yes Endoscopy discharge instructions have been reviewed and given to patient. The patient verbalized understanding and acceptance of instructions. Dr. Giovanna Quintero discussed with patient procedure findings and next steps.

## 2021-04-13 NOTE — ANESTHESIA POSTPROCEDURE EVALUATION
Procedure(s):  COLONOSCOPY  ENDOSCOPIC POLYPECTOMY. MAC    Anesthesia Post Evaluation      Multimodal analgesia: multimodal analgesia not used between 6 hours prior to anesthesia start to PACU discharge  Patient location during evaluation: PACU  Patient participation: complete - patient participated  Level of consciousness: awake  Pain management: adequate  Airway patency: patent  Anesthetic complications: no  Cardiovascular status: acceptable, blood pressure returned to baseline and hemodynamically stable  Respiratory status: acceptable  Hydration status: acceptable  Post anesthesia nausea and vomiting:  controlled  Final Post Anesthesia Temperature Assessment:  Normothermia (36.0-37.5 degrees C)      INITIAL Post-op Vital signs:   Vitals Value Taken Time   /78 04/13/21 0935   Temp 36.5 °C (97.7 °F) 04/13/21 0922   Pulse 65 04/13/21 0939   Resp 13 04/13/21 0939   SpO2 100 % 04/13/21 0939   Vitals shown include unvalidated device data.

## 2021-04-13 NOTE — DISCHARGE INSTRUCTIONS
[address] D. Ryan Sabillon MD  [phone]      April 13, 2021    Meet Meyer  YOB: 1950    COLONOSCOPY DISCHARGE INSTRUCTIONS    If there is redness at IV site you should apply warm compress to area. If redness or soreness persist contact Dr. Ryan Sabillon' or your primary care doctor. There may be a slight amount of blood passed from the rectum. Gaseous discomfort may develop, but walking, belching will help relieve this. You may not operate a vehicle for 12 hours  You may not operate machinery or dangerous appliances for rest of today  You may not drink alcoholic beverages for 12 hours  Avoid making any critical decisions for 24 hours    DIET:  You may resume your normal diet, but some patients find that heavy or large meals may lead to indigestion or vomiting. I suggest a light meal as first food intake. MEDICATIONS:  The use of some over-the-counter pain medication may lead to bleeding after colon biopsies or polyp removal.  Tylenol (also called acetaminophen) is safe to take even if you have had colonoscopy with polyp removal.  Based on the procedure you had today you may not safely take aspirin or aspirin-like products for the next ten (10) days. Remember that Tylenol (also called acetaminophen) is safe to take after colonoscopy even if you have had biopsies or polyps removed. ACTIVITY:  You may resume your normal household activities, but it is recommended that you spend the remainder of the day resting -  avoid any strenuous activity. CALL DR. Zoë Manrique' OFFICE IF:  Increasing pain, nausea, vomiting  Abdominal distension (swelling)  Significant new or increased bleeding (oral or rectal)  Fever/Chills  Chest pain/shortness of breath                       Additional instructions:   No aspirin 10 days. We found one tiny polyp and removed that today.   I'll contact you with the polyp results in about a week. The pain you've noted in the rectum may be from hemorrhoids because that's all we found there. I suggest high fiber diet and try stiz baths 2-3 times daily to see if that helps. It was an honor to be your doctor today. Please let me or my office staff know if you have any feedback about today's procedure. Marianne Forte MD    Colonoscopy saves lives, and can prevent colon cancer. Everyone aged 48 or older needs colonoscopy.   Tell your family and friends: get the test!

## 2021-10-20 NOTE — DISCHARGE INSTRUCTIONS
[address] [facility] PALMER Broussard MD  [phone]      October 17, 2018    Caren Cueva  YOB: 1950    COLONOSCOPY DISCHARGE INSTRUCTIONS    If there is redness at IV site you should apply warm compress to area. If redness or soreness persist contact Dr. Alta Broussard' or your primary care doctor. There may be a slight amount of blood passed from the rectum. Gaseous discomfort may develop, but walking, belching will help relieve this. You may not operate a vehicle for 12 hours  You may not operate machinery or dangerous appliances for rest of today  You may not drink alcoholic beverages for 12 hours  Avoid making any critical decisions for 24 hours    DIET:  You may resume your normal diet, but some patients find that heavy or large meals may lead to indigestion or vomiting. I suggest a light meal as first food intake. MEDICATIONS:  The use of some over-the-counter pain medication may lead to bleeding after colon biopsies or polyp removal.  Tylenol (also called acetaminophen) is safe to take even if you have had colonoscopy with polyp removal.  Based on the procedure you had today you may not safely take aspirin or aspirin-like products for the next ten (10) days. Remember that Tylenol (also called acetaminophen) is safe to take after colonoscopy even if you have had biopsies or polyps removed. ACTIVITY:  You may resume your normal household activities, but it is recommended that you spend the remainder of the day resting -  avoid any strenuous activity. CALL DR. Vaishali De La Garza' OFFICE IF:  Increasing pain, nausea, vomiting  Abdominal distension (swelling)  Significant new or increased bleeding (oral or rectal)  Fever/Chills  Chest pain/shortness of breath                       Additional instructions: The colon is healthy without polyps but there are hemorrhoids and diverticula noted.   For that you need to get History and Physical Preoperative Update Note    I reviewed the history and physical note (H&P) from Dr. Mckeon on 10/20/21 with the patient, and there are no changes in the patient's condition. Confirmed consent, including understanding of the risks, benefits and alternatives for ommaya placement. Operative site was marked. I discussed the plan with the Patient and answered all of their questions.     plenty of fiber in the diet and maybe even take metamucil twice daily. If this does not resolve hemorrhoid symptoms then we'd refer you for hemorrhoid surgery. The upper GI tract has significant acid irritation which we biopsied and we dilated a narrow area in the esophagus. It was an honor to be your doctor today. Please let me or my office staff know if you have any feedback about today's procedure. Lynette Stark MD    Colonoscopy saves lives, and can prevent colon cancer. Everyone aged 48 or older needs colonoscopy.   Tell your family and friends: get the test!

## 2021-11-01 ENCOUNTER — HOSPITAL ENCOUNTER (OUTPATIENT)
Dept: PHYSICAL THERAPY | Age: 71
Discharge: HOME | End: 2021-11-01
Payer: MEDICARE

## 2021-11-01 PROCEDURE — 97162 PT EVAL MOD COMPLEX 30 MIN: CPT | Performed by: PHYSICAL MEDICINE & REHABILITATION

## 2021-11-01 PROCEDURE — 97535 SELF CARE MNGMENT TRAINING: CPT | Performed by: PHYSICAL MEDICINE & REHABILITATION

## 2021-11-01 NOTE — PROGRESS NOTES
PT INITIAL EVALUATION NOTE - [facility] 2-15    Patient Name: Master Mackey  Date:2021  : 1950  [x]  Patient  Verified  Payor: [place] MEDICARE / Plan: [place] MEDICARE PART A & B / Product Type: Medicare /    In time: 1200  Out time:  0100  Total Treatment Time (min): 60  Total Timed Codes (min): 60  1:1 Treatment Time ( only): 60  Visit #: 1     Treatment Area: Pelvic floor dysfunction [M62.89]    SUBJECTIVE  Pain Level (0-10 scale): 3-4  Any medication changes, allergies to medications, adverse drug reactions, diagnosis change, or new procedure performed?: [] No    [x] Yes (see summary sheet for update)  Subjective:      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 She rectal pain after she passes a bowel movement that lasts hours or most of the day. She is pain free at times, usually at the end of the day. She notices the pain more when she is sitting. She walks 2-3 miles a day, notes less pain while walking. Last colonoscopy was this summer, internal hemorrhoids of medium size, otherwise clear. She has a BM daily rated BSS Type 1 every 2-3 days. She will sometimes pass terry of stool \"as I am pushing out the urine\". She does strain to pass stool. She uses Miralax inconsistently, usually once she feels abdominal discomfort. She denies bladder dysfunction. She denies fecal incontinence.    She denies pain with IC.     PMH:    Hiatal hernia     Bladder sling surgery 2016  Hand surgery R and L  -   L knee arthroscopic 2018    PLOF: no rectal pain  Mechanism of Injury: insidious onset  Previous Treatment/Compliance: none  Work Hx: retired  Living Situation: spouse  Pt Goals: no pelvic pain  Barriers: pt is a poor historian, struggles to quantify & describe symptoms  Motivation: none  Substance use: none  Cognition: A & O x 4 OBJECTIVE/EXAMINATION    External Pelvic Exam  Non tender through external pelvic clock  No POP at rest or with sustained valsalva  Active contraction:   Active relaxation:   Involuntary relaxation:     Internal Vaginal Exam:  Layer 1  Non tender, normal tone  Layer 2/3  Mild tenderness through levator ani, hypertonic throughout  Bladder neck mobility: WNL     PERFECT SCORE CHART  P =  Power (Laycock Scale Grade 0-5)   3  E =  Endurance (How long pt holds max contraction)  4  R =  Repetitions (How many times the repeats holds)  5  F =  Fast Twitch (How many 1 second contractions in 10 seconds)  5 (difficulty relaxing between efforts)  E =  Elevation (Lift of post vaginal wall toward pubic bone) present  C =  Coordinated cocontraction of transverse abdominus absent  T = Timing (squeeze and lift with cough) absent       30 min Self Care/Home Management:  []  See flow sheet :  Patient instructed in the role of physical therapy in the evaluation and treatment of pelvic floor dysfunction, applicable treatment modalities discussed. Expectations for regular home exercise participation and expected visit frequency in to achieve the patient's goals were discussed. Patient instructed in pelvic floor anatomy and function including levator ani, puborectalis and superficial pelvic  musculature. Patient was provided dietary information to improve stool quality including increasing soluble fiber intake (Bran Buds), probiotics (Activia yogurt) and increased water intake (6-8 glasses a day). Pt instructed in use of [place] stool chart to track progress. Patient educated in proper defecation posture and technique including use of Squatty Potty for better puborectalis ms relaxation. Pt to avoid straining to pass stool in order to decrease strain on pelvic floor.          Rationale: improve coordination and improve patient understanding, change daily habits  to improve the patients ability to eliminate pelvic pain with ADLs. With   [] TE   [] TA   [] Neuro   [] SC   [] other: Patient Education: [x] Review HEP    [] Progressed/Changed HEP based on:   [] positioning   [] body mechanics   [] transfers   [] heat/ice application    [] other:      Other Objective/Functional Measures:   [place] Female Pelvic Floor Questionnaire:  Bladder:  No bother  Bowel: Moderate bother  Prolapse:  No bother  Sexual Function:  No bother      Pain Level (0-10 scale) post treatment: 0     ASSESSMENT/Changes in Function:   Pt with complaints of constipation and rectal pain presents with weak and hypertonic, poorly relaxing pelvic floor musculature. She will benefit from skilled PT services to address her limitations and achieve her functional goals as stated on the plan of care.      [x]  See Plan of Chase Upton PT, MSPT 11/1/2021

## 2021-11-15 ENCOUNTER — HOSPITAL ENCOUNTER (OUTPATIENT)
Dept: PHYSICAL THERAPY | Age: 71
Discharge: HOME | End: 2021-11-15
Payer: MEDICARE

## 2021-11-15 PROCEDURE — 97110 THERAPEUTIC EXERCISES: CPT | Performed by: PHYSICAL MEDICINE & REHABILITATION

## 2021-11-15 PROCEDURE — 97112 NEUROMUSCULAR REEDUCATION: CPT | Performed by: PHYSICAL MEDICINE & REHABILITATION

## 2021-11-22 ENCOUNTER — HOSPITAL ENCOUNTER (OUTPATIENT)
Dept: PHYSICAL THERAPY | Age: 71
Discharge: HOME | End: 2021-11-22
Payer: MEDICARE

## 2021-11-22 PROCEDURE — 97110 THERAPEUTIC EXERCISES: CPT | Performed by: PHYSICAL MEDICINE & REHABILITATION

## 2021-11-22 PROCEDURE — 97140 MANUAL THERAPY 1/> REGIONS: CPT | Performed by: PHYSICAL MEDICINE & REHABILITATION

## 2021-11-22 NOTE — PROGRESS NOTES
Physical Therapy at [facility],   a part of [address]  [address] [place] [address] Lynette Phillips  Phone: [phone]  Fax: [phone]    Plan of Care/Statement of Necessity for Physical Therapy Services  2-15    Patient name: Rubio Butterfield  : 1950  Provider#: [id number]  Referral source: Fabrizio Luke MD      Medical/Treatment Diagnosis: Pelvic floor dysfunction [M62.89]     Prior Hospitalization: see medical history     Comorbidities: see EMR  Prior Level of Function: no pelvic pain  Medications: Verified on Patient Summary List  Start of Care: 21      Onset Date: \"2-3 years ago\"   The Plan of Care and following information is based on the information from the initial evaluation. Assessment/ key information: Pt with complaints of constipation and rectal pain presents with hypertonic, poorly relaxing pelvic floor musculature. She will benefit from skilled PT services to address her limitations and achieve her functional goals as stated on the plan of care. Evaluation Complexity History MEDIUM  Complexity : 1-2 comorbidities / personal factors will impact the outcome/ POC ; Examination MEDIUM Complexity : 3 Standardized tests and measures addressing body structure, function, activity limitation and / or participation in recreation  ;Presentation MEDIUM Complexity : Evolving with changing characteristics  ; Clinical Decision Making MEDIUM Complexity : FOTO score of 26-74  Overall Complexity Rating: MEDIUM    Problem List: pain affecting function, decrease strength, decrease ADL/ functional abilitiies, decrease activity tolerance and decrease flexibility/ joint mobility   Treatment Plan may include any combination of the following: Therapeutic exercise, Therapeutic activities, Neuromuscular re-education, Physical agent/modality, Manual therapy, Patient education, Self Care training and Functional mobility training  Patient / Family readiness to learn indicated by: interest  Persons(s) to be included in education: patient (P)  Barriers to Learning/Limitations: yes, pt struggles to quantify/describe symptoms and symptom response. Patient Goal (s): no rectal pain  Patient Self Reported Health Status: good  Rehabilitation Potential: good    Short Term Goals: To be accomplished in 6 weeks:  Patient will be independent with a progressive home exercise program    Patient will demonstrate improved PFM strength to 3+/5 bilaterally in order to decrease pelvic pain. Patient will demonstrate normal pelvic floor ms resting tone on sEMG biofeedback testing. Patient will be independent with proper defecation posture using Squatty Potty or similar  Patient will report improved stool quality to BSS 4 for 2 weeks. Long Term Goals: To be accomplished in 12 weeks:  Patient will demonstrate 4/5 PFM strength bilaterally in order to eliminate pelvic pain symptoms. Patient will demonstrate 10 second PFM holds at 4/5 in order to elminate pelvic pain symptoms. Patient will demonstrate normal PFM relaxation between squeeze efforts on sEMG biofeedback. Patient will have no rectal pain with sitting or immediately following defecation. Frequency / Duration: Patient to be seen 1 times per week for 6-12 weeks. Patient/ Caregiver education and instruction: self care    Certification Period: 11/1/21 - 2/1/22    Damir Roper PT, MSPT  11/01/2021    ________________________________________________________________________    I certify that the above Therapy Services are being furnished while the patient is under my care. I agree with the treatment plan and certify that this therapy is necessary.     Physician's Signature:____________________  Date:____________Time: _________         Kb Pleitez MD

## 2021-11-22 NOTE — PROGRESS NOTES
PT DAILY TREATMENT NOTE - [facility] 2-15    Patient Name: Dung Pascal  Date: 11/15/21 : 1950  [x]  Patient  Verified  Payor: Eneida Davis / Plan: [place] MEDICARE PART A & B / Product Type: Medicare /    In time: 120  Out time: 230  Total Treatment Time (min): 45  Total Timed Codes (min): 45  1:1 Treatment Time ( [address] only): 39   Visit #:  2    Treatment Area: Pelvic floor dysfunction [M62.89]    SUBJECTIVE  Pain Level (0-10 scale): 3  Any medication changes, allergies to medications, adverse drug reactions, diagnosis change, or new procedure performed?: [x] No    [] Yes (see summary sheet for update)  Subjective functional status/changes:   [x] No changes reported    OBJECTIVE    30 min Therapeutic Exercise:  [] See flow sheet :  PFMT with emphasis on downtraining, relaxation, breath coordination. Rationale: improve coordination to improve the patients ability to sit with no pain. 15 min Neuromuscular Re-education:  []  See flow sheet : Internal manual cues to levator ani for bulge/relax/lengthen. Rationale: improve coordination and increase proprioception  to improve the patients ability to eliminate rectal pain with ADLs. With   [x] TE   [] TA   [x] Neuro   [] SC   [] other: Patient Education: [x] Review HEP    [] Progressed/Changed HEP based on:   [x] positioning   [x] body mechanics   [] transfers   [] heat/ice application    [] other:      Other Objective/Functional Measures:  Demonstrates improved ability to relax pelvic floor musculature with manual cues and breath work. Pain Level (0-10 scale) post treatment:  2    ASSESSMENT/Changes in Function:     Patient will continue to benefit from skilled PT services to modify and progress therapeutic interventions to attain remaining goals. [x]  See Plan of Care  []  See progress note/recertification  []  See Discharge Summary         Progress towards goals / Updated goals:  Able to advance exercises today with good tolerance.   Pt continues to need instruction for correct exercise form and performance. Continues to demonstrate good potential to achieve functional goals stated on the plan of care.       PLAN  [x]  Upgrade activities as tolerated     []  Continue plan of care  []  Update interventions per flow sheet       []  Discharge due to:_  []  Other:_      Martha Khan PT, MSPT  11/22/2021

## 2021-11-22 NOTE — PROGRESS NOTES
PT DAILY TREATMENT NOTE - [facility] 2-15    Patient Name: Xochilt Lind  Date:2021  : 1950  [x]  Patient  Verified  Payor: VA MEDICARE / Plan: VA MEDICARE PART A & B / Product Type: Medicare /    In time: 1609  Out time: 0230  Total Treatment Time (min):  45  Total Timed Codes (min): 45  1:1 Treatment Time ([facility] only): 39   Visit #:  3    Treatment Area: Pelvic floor dysfunction [M62.89]    SUBJECTIVE  Pain Level (0-10 scale): 3   Any medication changes, allergies to medications, adverse drug reactions, diagnosis change, or new procedure performed?: [x] No    [] Yes (see summary sheet for update)  Subjective functional status/changes:   [] No changes reported  She is having daily BM BSS 4 using 1/2 cup Bran Buds. She notices some improvement in her rectal pain, some better. Pain is located deep into the pelvis and externally. Has level 3 pain when sitting. Pt reports a history of OA \"all over\". Has osteopenia L hip. OBJECTIVE    Tender externally at sacrococcygeal joint, tip of coccyx and into coccygeus ms L >R. Not quite able to reproduce CC pain but \"tender\". Internal rectal exam:   No pain at IAS/EAS  No pain with palpation to distal rectal wall. Normal PROM noted at sacrococcygeal joint - painfree  Tender and hypertonic along left puborectalis and ischiococcygeus compared to R  Able to reproduce CC pain with palpation to L ischiococcygeus. 30  min Therapeutic Exercise:  [] See flow sheet :   RADHA for outlet inhibition. Rationale: increase ROM and improve coordination to improve the patients ability to sit with no pain. 15 min Manual Therapy: Internal TPR to L ischiococcygeus with breath work. Rationale: decrease pain, increase tissue extensibility and decrease trigger points to improve the patients ability to eliminate rectal pain with ADLs.              With   [x] TE   [] TA   [] Neuro   [] SC   [] other: Patient Education: [x] Review HEP    [x] Progressed/Changed HEP based on:   [] positioning   [] body mechanics   [] transfers   [] heat/ice application    [x] other:    RADHA:   Seated [place] Ball IO/TA      Other Objective/Functional Measures:  Noted improved pelvic floor ms relaxation post treatment. Pain Level (0-10 scale) post treatment: 2    ASSESSMENT/Changes in Function:     Patient will continue to benefit from skilled PT services to modify and progress therapeutic interventions to attain remaining goals. [x]  See Plan of Care  []  See progress note/recertification  []  See Discharge Summary         Progress towards goals / Updated goals:  Able to advance exercises today with good tolerance. Pt continues to need instruction for correct exercise form and performance. Continues to demonstrate good potential to achieve functional goals stated on the plan of care.       PLAN  [x]  Upgrade activities as tolerated     []  Continue plan of care  []  Update interventions per flow sheet       []  Discharge due to:_  []  Other:_      Cleo Cedeno, PT, MSPT   11/22/2021

## 2021-12-03 ENCOUNTER — HOSPITAL ENCOUNTER (OUTPATIENT)
Dept: PHYSICAL THERAPY | Age: 71
Discharge: HOME | End: 2021-12-03
Payer: MEDICARE

## 2021-12-03 PROCEDURE — 97110 THERAPEUTIC EXERCISES: CPT | Performed by: PHYSICAL MEDICINE & REHABILITATION

## 2021-12-03 NOTE — PROGRESS NOTES
PT DAILY TREATMENT NOTE 2-15    Patient Name: Bryan Abbasi  Date:12/3/2021  : 1950  [x]  Patient  Verified  Payor: [place] MEDICARE / Plan: VA MEDICARE PART A & B / Product Type: Medicare /    In time: 1200  Out time: 4574  Total Treatment Time (min): 45  Visit #:  4    Treatment Area: Fecal incontinence [R15.9]    SUBJECTIVE  Pain Level (0-10 scale): 0     Any medication changes, allergies to medications, adverse drug reactions, diagnosis change, or new procedure performed?: [x] No    [] Yes (see summary sheet for update)  Subjective functional status/changes:   [] No changes reported  Notes little change since last visit, doing her exs. OBJECTIVE    45 min Therapeutic Exercise:  [] See flow sheet :    PFMT and HEP update / practice:     Access Code: [facility]  URL: [email or web address]  Date: 2021  Prepared by: Kathya Quesada    Exercises  Supine Hip Adduction Isometric with Ball - 1 x daily - 7 x weekly - 5 sets - 5 reps  Hooklying Clamshell with Resistance - 1 x daily - 7 x weekly - 3 sets - 10 reps  Supine Bridge with Spinal Articulation - 1 x daily - 7 x weekly - 2 sets - 10 reps  Clamshell - 1 x daily - 7 x weekly - 3 sets - 10 reps  Sidelying Reverse Clamshell - 1 x daily - 7 x weekly - 3 sets - 10 reps       Rationale: increase strength and improve coordination to improve the patients ability to eliminate rectal pain. With   [x] TE   [] TA   [] Neuro   [] SC   [] other: Patient Education: [x] Review HEP    [x] Progressed/Changed HEP based on:   [x] positioning   [x] body mechanics   [] transfers   [] heat/ice application    [] other:      Other Objective/Functional Measures:     + B Trendelenburg gait  Decreased hip abductor strength.    Tender to L abductor     Pain Level (0-10 scale) post treatment: 1    ASSESSMENT/Changes in Function:     Patient will continue to benefit from skilled PT services to modify and progress therapeutic interventions to attain remaining goals. [x]  See Plan of Care  []  See progress note/recertification  []  See Discharge Summary         Progress towards goals / Updated goals:  Able to advance exercises today with good tolerance. Pt continues to need instruction for correct exercise form and performance. Continues to demonstrate good potential to achieve functional goals stated on the plan of care.       PLAN  [x]  Upgrade activities as tolerated     []  Continue plan of care  []  Update interventions per flow sheet       []  Discharge due to:_  []  Other:_      Ifrah Sharp, PT, MSPT   12/3/2021

## 2021-12-10 ENCOUNTER — HOSPITAL ENCOUNTER (OUTPATIENT)
Dept: PHYSICAL THERAPY | Age: 71
Discharge: HOME | End: 2021-12-10
Payer: MEDICARE

## 2021-12-10 PROCEDURE — 97110 THERAPEUTIC EXERCISES: CPT | Performed by: PHYSICAL MEDICINE & REHABILITATION

## 2021-12-10 NOTE — PROGRESS NOTES
PT DAILY TREATMENT NOTE 2-15    Patient Name: Jeffry [place]  Date:12/10/2021  : 1950  [x]  Patient  Verified  Payor: Jason Lassiter / Plan: VA MEDICARE PART A & B / Product Type: Medicare /    In time: 1200  Out time: 8197  Total Treatment Time (min): 45  Visit #:  5    Treatment Area: Fecal incontinence [R15.9]    SUBJECTIVE  Pain Level (0-10 scale): 0     Any medication changes, allergies to medications, adverse drug reactions, diagnosis change, or new procedure performed?: [x] No    [] Yes (see summary sheet for update)  Subjective functional status/changes:   [] No changes reported    Doing her exs. Feels stronger through the hips. So far the most helpful intervention has been improving stool quality and ease. Notes less rectal pain following BM but not resolved. Has no pain with sitting, BM is the agg factor. OBJECTIVE    45 min Therapeutic Exercise:  [] See flow sheet :    PFMT and HEP update / practice:     Access Code: [facility]  URL: [email or web address]  Date: 12/10/2021  Prepared by: Paolo Garza    Exercises  Supine Hip Adduction Isometric with Ball - 1 x daily - 7 x weekly - 5 sets - 5 reps  Hooklying Clamshell with Resistance - 1 x daily - 7 x weekly - 3 sets - 10 reps  Supine Bridge with Spinal Articulation - 1 x daily - 7 x weekly - 2 sets - 10 reps  Clamshell - 1 x daily - 7 x weekly - 3 sets - 10 reps  Sidelying Reverse Clamshell - 1 x daily - 7 x weekly - 3 sets - 10 reps  Supine Bridge with Mini Swiss Ball Between Knees - 1 x daily - 7 x weekly - 5 sets - 5 reps  Supine Bridge with Resistance Band - 1 x daily - 7 x weekly - 5 sets - 5 reps  Marching Bridge - 1 x daily - 7 x weekly - 5 sets - 5 reps  Supine Bridge with Leg Extension - 1 x daily - 7 x weekly - 3 sets - 10 reps  Mini Squat with Chair - 1 x daily - 7 x weekly - 3 sets - 10 reps  Hip Hiking on Step - 1 x daily - 7 x weekly - 3 sets - 10 reps         Rationale: increase strength and improve coordination to improve the patients ability to eliminate rectal pain. With   [x] TE   [] TA   [] Neuro   [] SC   [] other: Patient Education: [x] Review HEP    [x] Progressed/Changed HEP based on:   [x] positioning   [x] body mechanics   [] transfers   [] heat/ice application    [] other:      Other Objective/Functional Measures:     + B Trendelenburg gait  Decreased hip abductor strength. Tender to L abductor     Pain Level (0-10 scale) post treatment: 1    ASSESSMENT/Changes in Function:  Progressed hip exs today. Pt wants to focus on these. Will see back in 3 weeks. Patient will continue to benefit from skilled PT services to modify and progress therapeutic interventions to attain remaining goals. [x]  See Plan of Care  []  See progress note/recertification  []  See Discharge Summary         Progress towards goals / Updated goals:  Able to advance exercises today with good tolerance. Pt continues to need instruction for correct exercise form and performance. Continues to demonstrate good potential to achieve functional goals stated on the plan of care. PLAN  [x]  Upgrade activities as tolerated     []  Continue plan of care  []  Update interventions per flow sheet       []  Discharge due to:_  [x]  Other:  Will see back in 3 weeks.      Lowell Car, PT, MSPT   12/10/2021

## 2022-01-28 NOTE — PROGRESS NOTES
Physical Therapy at [facility],   a part of  [facility]  [place]  [facility]  Lynette Brito [address]  Phone: [phone] Fax: [phone]      Discharge Summary 2-15    Patient name: Lino Washington  : 1950  Provider#: [id number]  Referral source: Sparkle Ríos MD      Medical/Treatment Diagnosis: Fecal incontinence [R15.9]     Prior Hospitalization: see medical history     Comorbidities: See Plan of Care  Prior Level of Function: See Plan of Care  Medications: Verified on Patient Summary List    Start of Care: 2021     Onset Date: Chronic   Visits from Start of Care:  5     Missed Visits: 0  Reporting Period : 2021  to 2022    Assessment/Summary of care: Mrs Ines Mcclure was referred to [facility] for evaluation and treatment of rectal pain, chronic constipation. Treatment consisted of manual therapy, therapeutic exercise, therapeutic activity, bladder health education, bowel habit education, pelvic floor ms training, home exercise program development and progression. She made steady gains with PT efforts, noted improved stool quality to BSS 4 with dietary changes, reported increased ease of defecation with less pain with proper toilet positioning. Symptoms improved but have not completely resolved with stool quality improvement and exercises. Pain is reproduced with stooling. Recommend further evaluation to evaluate hemorrhoids. She is discharged from PT with functional improvements, most goals met. Short Term Goals: To be accomplished in 6 weeks:  Patient will be independent with a progressive home exercise program  MET  Patient will demonstrate improved PFM strength to 3+/5 bilaterally in order to decrease pelvic pain. MET  Patient will demonstrate normal pelvic floor ms resting tone on sEMG biofeedback testing.  MET  Patient will be independent with proper defecation posture using Squatty Potty or similar  MET  Patient will report improved stool quality to BSS 4 for 2 weeks. MET     Long Term Goals: To be accomplished in 12 weeks:  Patient will demonstrate 4/5 PFM strength bilaterally in order to eliminate pelvic pain symptoms. Progressing  Patient will demonstrate 10 second PFM holds at 4/5 in order to elminate pelvic pain symptoms. Progressing  Patient will demonstrate normal PFM relaxation between squeeze efforts on sEMG biofeedback. MET  Patient will have no rectal pain with sitting or immediately following defecation. Improved    RECOMMENDATIONS:  [x]Discontinue therapy: [x]Patient has reached or is progressing toward set goals     []Patient is non-compliant or has abdicated     []Due to lack of appreciable progress towards set goals     [x]Other: Max benefit PT, further medical evaluation recommended.      Shai Mendez, PT, MSPT  1/28/2022

## 2022-03-20 PROBLEM — G43.111 INTRACTABLE MIGRAINE WITH AURA WITH STATUS MIGRAINOSUS: Status: ACTIVE | Noted: 2017-08-25

## 2022-07-22 ENCOUNTER — TRANSCRIBE ORDER (OUTPATIENT)
Dept: SCHEDULING | Age: 72
End: 2022-07-22

## 2022-07-22 DIAGNOSIS — M54.16 LUMBAR RADICULITIS: Primary | ICD-10-CM

## 2022-07-25 ENCOUNTER — HOSPITAL ENCOUNTER (OUTPATIENT)
Dept: MRI IMAGING | Age: 72
Discharge: HOME | End: 2022-07-25
Attending: ORTHOPAEDIC SURGERY
Payer: MEDICARE

## 2022-07-25 DIAGNOSIS — M54.16 LUMBAR RADICULITIS: ICD-10-CM

## 2022-07-25 PROCEDURE — 72148 MRI LUMBAR SPINE W/O DYE: CPT

## 2024-03-13 ENCOUNTER — TRANSCRIBE ORDERS (OUTPATIENT)
Facility: HOSPITAL | Age: 74
End: 2024-03-13

## 2024-03-13 DIAGNOSIS — S83.241A OTHER TEAR OF MEDIAL MENISCUS, CURRENT INJURY, RIGHT KNEE, INITIAL ENCOUNTER: Primary | ICD-10-CM

## 2024-03-21 ENCOUNTER — HOSPITAL ENCOUNTER (OUTPATIENT)
Facility: HOSPITAL | Age: 74
Discharge: HOME | End: 2024-03-21
Attending: ORTHOPAEDIC SURGERY
Payer: MEDICARE

## 2024-03-21 DIAGNOSIS — S83.241A OTHER TEAR OF MEDIAL MENISCUS, CURRENT INJURY, RIGHT KNEE, INITIAL ENCOUNTER: ICD-10-CM

## 2024-03-21 PROCEDURE — 73721 MRI JNT OF LWR EXTRE W/O DYE: CPT

## 2024-07-15 ENCOUNTER — HOSPITAL ENCOUNTER (OUTPATIENT)
Facility: HOSPITAL | Age: 74
Discharge: HOME | End: 2024-07-18
Attending: ORTHOPAEDIC SURGERY
Payer: MEDICARE

## 2024-07-15 DIAGNOSIS — M54.50 LUMBAR SPINE PAIN: ICD-10-CM

## 2024-07-15 DIAGNOSIS — M54.16 LUMBAR RADICULITIS: ICD-10-CM

## 2024-07-15 PROCEDURE — 72148 MRI LUMBAR SPINE W/O DYE: CPT

## (undated) DEVICE — INFECTION CONTROL KIT SYS

## (undated) DEVICE — ELECTRODE,RADIOTRANSLUCENT,FOAM,3PK: Brand: MEDLINE

## (undated) DEVICE — ROCKER SWITCH PENCIL BLADE ELECTRODE, HOLSTER: Brand: EDGE

## (undated) DEVICE — SIMPLICITY FLUFF UNDERPAD 23X36, MODERATE: Brand: SIMPLICITY

## (undated) DEVICE — Device

## (undated) DEVICE — (D)PREP SKN CHLRAPRP APPL 26ML -- CONVERT TO ITEM 371833

## (undated) DEVICE — KENDALL SCD EXPRESS SLEEVES, KNEE LENGTH, MEDIUM: Brand: KENDALL SCD

## (undated) DEVICE — FORCEPS BX L240CM JAW DIA2.8MM L CAP W/ NDL MIC MESH TOOTH

## (undated) DEVICE — KIT COLON W/ 1.1OZ LUB AND 2 END

## (undated) DEVICE — 1200 GUARD II KIT W/5MM TUBE W/O VAC TUBE: Brand: GUARDIAN

## (undated) DEVICE — CONTAINER SPEC 20 ML LID NEUT BUFF FORMALIN 10 % POLYPR STS

## (undated) DEVICE — COVER LT HNDL BLU PLAS

## (undated) DEVICE — CANN NASAL O2 CAPNOGRAPHY AD -- FILTERLINE

## (undated) DEVICE — BITEBLOCK ENDOSCP 60FR MAXI WHT POLYETH STURDY W/ VELC WVN

## (undated) DEVICE — SET GRAV CK VLV NEEDLESS ST 3 GANGED 4WAY STPCOCK HI FLO 10

## (undated) DEVICE — SET ADMIN 16ML TBNG L100IN 2 Y INJ SITE IV PIGGY BK DISP

## (undated) DEVICE — BAG BELONG PT PERS CLEAR HANDL

## (undated) DEVICE — ADULT SPO2 SENSOR: Brand: NELLCOR

## (undated) DEVICE — SOLUTION IRRIG 1000ML H2O STRL BLT

## (undated) DEVICE — DEVICE TRNSF SPIK STL 2008S] MICROTEK MEDICAL INC]

## (undated) DEVICE — 3M™ CUROS™ DISINFECTING CAP FOR NEEDLELESS CONNECTORS 270/CARTON 20 CARTONS/CASE CFF1-270: Brand: CUROS™

## (undated) DEVICE — SUTURE VCRL SZ 3-0 L27IN ABSRB UD L26MM SH 1/2 CIR J416H

## (undated) DEVICE — CATH IV AUTOGRD BC PNK 20GA 25 -- INSYTE

## (undated) DEVICE — SOLIDIFIER MEDC 1200ML -- CONVERT TO 356117

## (undated) DEVICE — STERILE POLYISOPRENE POWDER-FREE SURGICAL GLOVES: Brand: PROTEXIS

## (undated) DEVICE — SYR ASSEMB INFL BLLN 60ML --

## (undated) DEVICE — (D)SYR 10ML 1/5ML GRAD NSAF -- PKGING CHANGE USE ITEM 338027

## (undated) DEVICE — KENDALL RADIOLUCENT FOAM MONITORING ELECTRODE -RECTANGULAR SHAPE: Brand: KENDALL

## (undated) DEVICE — TOWEL SURG W17XL27IN STD BLU COT NONFENESTRATED PREWASHED

## (undated) DEVICE — BAG SPEC BIOHZRD 10 X 10 IN --

## (undated) DEVICE — BASIN EMSIS 16OZ GRAPHITE PLAS KID SHP MOLD GRAD FOR ORAL

## (undated) DEVICE — NEEDLE HYPO 22GA L1.5IN BLK S STL HUB POLYPR SHLD REG BVL

## (undated) DEVICE — 3000CC GUARDIAN II: Brand: GUARDIAN

## (undated) DEVICE — NDL PRT INJ NSAF BLNT 18GX1.5 --

## (undated) DEVICE — SYR 3ML LL TIP 1/10ML GRAD --

## (undated) DEVICE — INSULATED BLADE ELECTRODE: Brand: EDGE

## (undated) DEVICE — NDL FLTR TIP 5 MIC 18GX1.5IN --

## (undated) DEVICE — DEVON™ KNEE AND BODY STRAP 60" X 3" (1.5 M X 7.6 CM): Brand: DEVON

## (undated) DEVICE — SUTURE VCRL SZ 4-0 L27IN ABSRB UD L19MM PS-2 3/8 CIR PRIM J426H

## (undated) DEVICE — CHEST PACK: Brand: MEDLINE INDUSTRIES, INC.

## (undated) DEVICE — ESOPHAGEAL BALLOON DILATATION CATHETER: Brand: CRE FIXED WIRE

## (undated) DEVICE — SYR 5ML 1/5 GRAD LL NSAF LF --

## (undated) DEVICE — CATH IV AUTOGRD BC BLU 22GA 25 -- INSYTE